# Patient Record
Sex: MALE | Race: WHITE | NOT HISPANIC OR LATINO
[De-identification: names, ages, dates, MRNs, and addresses within clinical notes are randomized per-mention and may not be internally consistent; named-entity substitution may affect disease eponyms.]

---

## 2017-01-04 ENCOUNTER — APPOINTMENT (OUTPATIENT)
Dept: PSYCHIATRY | Facility: CLINIC | Age: 22
End: 2017-01-04

## 2017-01-11 ENCOUNTER — APPOINTMENT (OUTPATIENT)
Dept: PSYCHIATRY | Facility: CLINIC | Age: 22
End: 2017-01-11

## 2017-01-18 ENCOUNTER — APPOINTMENT (OUTPATIENT)
Dept: PSYCHIATRY | Facility: CLINIC | Age: 22
End: 2017-01-18

## 2017-01-25 ENCOUNTER — APPOINTMENT (OUTPATIENT)
Dept: PSYCHIATRY | Facility: CLINIC | Age: 22
End: 2017-01-25

## 2017-01-26 ENCOUNTER — APPOINTMENT (OUTPATIENT)
Dept: PSYCHIATRY | Facility: CLINIC | Age: 22
End: 2017-01-26

## 2017-02-02 ENCOUNTER — APPOINTMENT (OUTPATIENT)
Dept: PSYCHIATRY | Facility: CLINIC | Age: 22
End: 2017-02-02

## 2017-02-08 ENCOUNTER — APPOINTMENT (OUTPATIENT)
Dept: PSYCHIATRY | Facility: CLINIC | Age: 22
End: 2017-02-08

## 2017-02-16 ENCOUNTER — APPOINTMENT (OUTPATIENT)
Dept: PSYCHIATRY | Facility: CLINIC | Age: 22
End: 2017-02-16

## 2017-03-02 ENCOUNTER — APPOINTMENT (OUTPATIENT)
Dept: PSYCHIATRY | Facility: CLINIC | Age: 22
End: 2017-03-02

## 2017-03-09 ENCOUNTER — APPOINTMENT (OUTPATIENT)
Dept: PSYCHIATRY | Facility: CLINIC | Age: 22
End: 2017-03-09

## 2017-03-16 ENCOUNTER — APPOINTMENT (OUTPATIENT)
Dept: PSYCHIATRY | Facility: CLINIC | Age: 22
End: 2017-03-16

## 2017-03-23 ENCOUNTER — APPOINTMENT (OUTPATIENT)
Dept: PSYCHIATRY | Facility: CLINIC | Age: 22
End: 2017-03-23

## 2017-03-30 ENCOUNTER — APPOINTMENT (OUTPATIENT)
Dept: PSYCHIATRY | Facility: CLINIC | Age: 22
End: 2017-03-30

## 2017-04-06 ENCOUNTER — APPOINTMENT (OUTPATIENT)
Dept: PSYCHIATRY | Facility: CLINIC | Age: 22
End: 2017-04-06

## 2017-04-13 ENCOUNTER — APPOINTMENT (OUTPATIENT)
Dept: PSYCHIATRY | Facility: CLINIC | Age: 22
End: 2017-04-13

## 2017-04-20 ENCOUNTER — APPOINTMENT (OUTPATIENT)
Dept: PSYCHIATRY | Facility: CLINIC | Age: 22
End: 2017-04-20

## 2017-04-27 ENCOUNTER — APPOINTMENT (OUTPATIENT)
Dept: PSYCHIATRY | Facility: CLINIC | Age: 22
End: 2017-04-27

## 2017-05-04 ENCOUNTER — APPOINTMENT (OUTPATIENT)
Dept: PSYCHIATRY | Facility: CLINIC | Age: 22
End: 2017-05-04

## 2017-05-11 ENCOUNTER — APPOINTMENT (OUTPATIENT)
Dept: PSYCHIATRY | Facility: CLINIC | Age: 22
End: 2017-05-11

## 2017-05-18 ENCOUNTER — APPOINTMENT (OUTPATIENT)
Dept: PSYCHIATRY | Facility: CLINIC | Age: 22
End: 2017-05-18

## 2017-05-25 ENCOUNTER — APPOINTMENT (OUTPATIENT)
Dept: PSYCHIATRY | Facility: CLINIC | Age: 22
End: 2017-05-25

## 2017-06-01 ENCOUNTER — APPOINTMENT (OUTPATIENT)
Dept: PSYCHIATRY | Facility: CLINIC | Age: 22
End: 2017-06-01

## 2017-06-08 ENCOUNTER — APPOINTMENT (OUTPATIENT)
Dept: PSYCHIATRY | Facility: CLINIC | Age: 22
End: 2017-06-08

## 2017-06-15 ENCOUNTER — APPOINTMENT (OUTPATIENT)
Dept: PSYCHIATRY | Facility: CLINIC | Age: 22
End: 2017-06-15

## 2017-06-22 ENCOUNTER — APPOINTMENT (OUTPATIENT)
Dept: PSYCHIATRY | Facility: CLINIC | Age: 22
End: 2017-06-22

## 2017-06-29 ENCOUNTER — APPOINTMENT (OUTPATIENT)
Dept: PSYCHIATRY | Facility: CLINIC | Age: 22
End: 2017-06-29

## 2017-07-06 ENCOUNTER — APPOINTMENT (OUTPATIENT)
Dept: PSYCHIATRY | Facility: CLINIC | Age: 22
End: 2017-07-06

## 2017-07-13 ENCOUNTER — APPOINTMENT (OUTPATIENT)
Dept: PSYCHIATRY | Facility: CLINIC | Age: 22
End: 2017-07-13

## 2017-07-24 ENCOUNTER — APPOINTMENT (OUTPATIENT)
Dept: PSYCHIATRY | Facility: CLINIC | Age: 22
End: 2017-07-24

## 2017-07-28 ENCOUNTER — APPOINTMENT (OUTPATIENT)
Dept: PSYCHIATRY | Facility: CLINIC | Age: 22
End: 2017-07-28
Payer: COMMERCIAL

## 2017-07-28 PROCEDURE — 90834 PSYTX W PT 45 MINUTES: CPT

## 2017-07-31 ENCOUNTER — APPOINTMENT (OUTPATIENT)
Dept: PSYCHIATRY | Facility: CLINIC | Age: 22
End: 2017-07-31
Payer: COMMERCIAL

## 2017-07-31 PROCEDURE — 99214 OFFICE O/P EST MOD 30 MIN: CPT

## 2017-08-02 ENCOUNTER — APPOINTMENT (OUTPATIENT)
Dept: PSYCHIATRY | Facility: CLINIC | Age: 22
End: 2017-08-02
Payer: COMMERCIAL

## 2017-08-02 PROCEDURE — 90834 PSYTX W PT 45 MINUTES: CPT

## 2017-08-18 ENCOUNTER — APPOINTMENT (OUTPATIENT)
Dept: PSYCHIATRY | Facility: CLINIC | Age: 22
End: 2017-08-18
Payer: COMMERCIAL

## 2017-08-18 PROCEDURE — 90834 PSYTX W PT 45 MINUTES: CPT

## 2017-09-13 ENCOUNTER — APPOINTMENT (OUTPATIENT)
Dept: PSYCHIATRY | Facility: CLINIC | Age: 22
End: 2017-09-13
Payer: COMMERCIAL

## 2017-09-13 PROCEDURE — 90834 PSYTX W PT 45 MINUTES: CPT

## 2017-09-20 ENCOUNTER — APPOINTMENT (OUTPATIENT)
Dept: PSYCHIATRY | Facility: CLINIC | Age: 22
End: 2017-09-20

## 2017-09-27 ENCOUNTER — APPOINTMENT (OUTPATIENT)
Dept: PSYCHIATRY | Facility: CLINIC | Age: 22
End: 2017-09-27

## 2017-09-29 ENCOUNTER — APPOINTMENT (OUTPATIENT)
Dept: PSYCHIATRY | Facility: CLINIC | Age: 22
End: 2017-09-29
Payer: COMMERCIAL

## 2017-09-29 PROCEDURE — 99214 OFFICE O/P EST MOD 30 MIN: CPT

## 2017-10-04 ENCOUNTER — APPOINTMENT (OUTPATIENT)
Dept: PSYCHIATRY | Facility: CLINIC | Age: 22
End: 2017-10-04

## 2017-10-18 ENCOUNTER — APPOINTMENT (OUTPATIENT)
Dept: PSYCHIATRY | Facility: CLINIC | Age: 22
End: 2017-10-18
Payer: COMMERCIAL

## 2017-10-18 PROCEDURE — 90834 PSYTX W PT 45 MINUTES: CPT

## 2017-10-20 ENCOUNTER — APPOINTMENT (OUTPATIENT)
Dept: PSYCHIATRY | Facility: CLINIC | Age: 22
End: 2017-10-20
Payer: COMMERCIAL

## 2017-10-20 PROCEDURE — 99214 OFFICE O/P EST MOD 30 MIN: CPT

## 2017-10-20 RX ORDER — DICLOFENAC SODIUM 75 MG/1
75 TABLET, DELAYED RELEASE ORAL
Qty: 60 | Refills: 0 | Status: DISCONTINUED | COMMUNITY
Start: 2017-05-31

## 2017-11-01 ENCOUNTER — APPOINTMENT (OUTPATIENT)
Dept: PSYCHIATRY | Facility: CLINIC | Age: 22
End: 2017-11-01
Payer: COMMERCIAL

## 2017-11-01 PROCEDURE — 90834 PSYTX W PT 45 MINUTES: CPT

## 2017-11-09 ENCOUNTER — APPOINTMENT (OUTPATIENT)
Dept: PSYCHIATRY | Facility: CLINIC | Age: 22
End: 2017-11-09
Payer: COMMERCIAL

## 2017-11-09 PROCEDURE — 90837 PSYTX W PT 60 MINUTES: CPT

## 2017-11-15 ENCOUNTER — APPOINTMENT (OUTPATIENT)
Dept: PSYCHIATRY | Facility: CLINIC | Age: 22
End: 2017-11-15
Payer: COMMERCIAL

## 2017-11-15 PROCEDURE — 90837 PSYTX W PT 60 MINUTES: CPT

## 2017-11-21 ENCOUNTER — APPOINTMENT (OUTPATIENT)
Dept: PSYCHIATRY | Facility: CLINIC | Age: 22
End: 2017-11-21
Payer: COMMERCIAL

## 2017-11-21 PROCEDURE — 90837 PSYTX W PT 60 MINUTES: CPT

## 2017-11-27 ENCOUNTER — APPOINTMENT (OUTPATIENT)
Dept: PSYCHIATRY | Facility: CLINIC | Age: 22
End: 2017-11-27

## 2017-12-01 ENCOUNTER — APPOINTMENT (OUTPATIENT)
Dept: PSYCHIATRY | Facility: CLINIC | Age: 22
End: 2017-12-01
Payer: COMMERCIAL

## 2017-12-01 PROCEDURE — 90837 PSYTX W PT 60 MINUTES: CPT

## 2017-12-11 ENCOUNTER — APPOINTMENT (OUTPATIENT)
Dept: PSYCHIATRY | Facility: CLINIC | Age: 22
End: 2017-12-11
Payer: COMMERCIAL

## 2017-12-11 PROCEDURE — 90837 PSYTX W PT 60 MINUTES: CPT

## 2017-12-18 ENCOUNTER — APPOINTMENT (OUTPATIENT)
Dept: PSYCHIATRY | Facility: CLINIC | Age: 22
End: 2017-12-18
Payer: COMMERCIAL

## 2017-12-18 PROCEDURE — 90837 PSYTX W PT 60 MINUTES: CPT

## 2017-12-18 PROCEDURE — 99214 OFFICE O/P EST MOD 30 MIN: CPT

## 2017-12-18 RX ORDER — ERYTHROMYCIN AND BENZOYL PEROXIDE 3 %-5 %
5-3 KIT TOPICAL
Qty: 47 | Refills: 0 | Status: DISCONTINUED | COMMUNITY
Start: 2017-12-14

## 2017-12-18 RX ORDER — DOXYCYCLINE HYCLATE 100 MG/1
100 TABLET ORAL
Qty: 30 | Refills: 0 | Status: DISCONTINUED | COMMUNITY
Start: 2017-12-14

## 2018-01-03 ENCOUNTER — APPOINTMENT (OUTPATIENT)
Dept: PSYCHIATRY | Facility: CLINIC | Age: 23
End: 2018-01-03
Payer: COMMERCIAL

## 2018-01-03 PROCEDURE — 90837 PSYTX W PT 60 MINUTES: CPT

## 2018-01-08 ENCOUNTER — APPOINTMENT (OUTPATIENT)
Dept: PSYCHIATRY | Facility: CLINIC | Age: 23
End: 2018-01-08
Payer: COMMERCIAL

## 2018-01-08 PROCEDURE — 90837 PSYTX W PT 60 MINUTES: CPT

## 2018-01-16 ENCOUNTER — APPOINTMENT (OUTPATIENT)
Dept: PSYCHIATRY | Facility: CLINIC | Age: 23
End: 2018-01-16

## 2018-01-22 ENCOUNTER — APPOINTMENT (OUTPATIENT)
Dept: PSYCHIATRY | Facility: CLINIC | Age: 23
End: 2018-01-22
Payer: COMMERCIAL

## 2018-01-22 PROCEDURE — 90837 PSYTX W PT 60 MINUTES: CPT

## 2018-01-23 ENCOUNTER — APPOINTMENT (OUTPATIENT)
Dept: PSYCHIATRY | Facility: CLINIC | Age: 23
End: 2018-01-23
Payer: COMMERCIAL

## 2018-01-23 PROCEDURE — 90837 PSYTX W PT 60 MINUTES: CPT

## 2018-01-29 ENCOUNTER — APPOINTMENT (OUTPATIENT)
Dept: PSYCHIATRY | Facility: CLINIC | Age: 23
End: 2018-01-29
Payer: COMMERCIAL

## 2018-01-29 ENCOUNTER — APPOINTMENT (OUTPATIENT)
Dept: PSYCHIATRY | Facility: CLINIC | Age: 23
End: 2018-01-29

## 2018-01-29 PROCEDURE — 90853 GROUP PSYCHOTHERAPY: CPT

## 2018-02-05 ENCOUNTER — APPOINTMENT (OUTPATIENT)
Dept: PSYCHIATRY | Facility: CLINIC | Age: 23
End: 2018-02-05
Payer: COMMERCIAL

## 2018-02-05 PROCEDURE — 90853 GROUP PSYCHOTHERAPY: CPT

## 2018-02-12 ENCOUNTER — APPOINTMENT (OUTPATIENT)
Dept: PSYCHIATRY | Facility: CLINIC | Age: 23
End: 2018-02-12
Payer: COMMERCIAL

## 2018-02-12 PROCEDURE — 90853 GROUP PSYCHOTHERAPY: CPT

## 2018-02-26 ENCOUNTER — APPOINTMENT (OUTPATIENT)
Dept: PSYCHIATRY | Facility: CLINIC | Age: 23
End: 2018-02-26
Payer: COMMERCIAL

## 2018-02-26 PROCEDURE — 90853 GROUP PSYCHOTHERAPY: CPT

## 2018-03-05 ENCOUNTER — APPOINTMENT (OUTPATIENT)
Dept: PSYCHIATRY | Facility: CLINIC | Age: 23
End: 2018-03-05
Payer: COMMERCIAL

## 2018-03-05 ENCOUNTER — APPOINTMENT (OUTPATIENT)
Dept: PSYCHIATRY | Facility: CLINIC | Age: 23
End: 2018-03-05

## 2018-03-05 PROCEDURE — 90853 GROUP PSYCHOTHERAPY: CPT

## 2018-03-12 ENCOUNTER — APPOINTMENT (OUTPATIENT)
Dept: PSYCHIATRY | Facility: CLINIC | Age: 23
End: 2018-03-12

## 2018-03-19 ENCOUNTER — APPOINTMENT (OUTPATIENT)
Dept: PSYCHIATRY | Facility: CLINIC | Age: 23
End: 2018-03-19
Payer: COMMERCIAL

## 2018-03-19 PROCEDURE — 90853 GROUP PSYCHOTHERAPY: CPT

## 2018-03-20 ENCOUNTER — APPOINTMENT (OUTPATIENT)
Dept: PSYCHIATRY | Facility: CLINIC | Age: 23
End: 2018-03-20
Payer: COMMERCIAL

## 2018-03-20 PROCEDURE — 99214 OFFICE O/P EST MOD 30 MIN: CPT

## 2018-03-21 ENCOUNTER — APPOINTMENT (OUTPATIENT)
Dept: PSYCHIATRY | Facility: CLINIC | Age: 23
End: 2018-03-21

## 2018-03-23 ENCOUNTER — APPOINTMENT (OUTPATIENT)
Dept: PSYCHIATRY | Facility: CLINIC | Age: 23
End: 2018-03-23
Payer: COMMERCIAL

## 2018-03-23 PROCEDURE — 90837 PSYTX W PT 60 MINUTES: CPT

## 2018-03-26 ENCOUNTER — APPOINTMENT (OUTPATIENT)
Dept: PSYCHIATRY | Facility: CLINIC | Age: 23
End: 2018-03-26
Payer: COMMERCIAL

## 2018-03-26 PROCEDURE — 90853 GROUP PSYCHOTHERAPY: CPT

## 2018-03-28 ENCOUNTER — APPOINTMENT (OUTPATIENT)
Dept: PSYCHIATRY | Facility: CLINIC | Age: 23
End: 2018-03-28

## 2018-04-02 ENCOUNTER — APPOINTMENT (OUTPATIENT)
Dept: PSYCHIATRY | Facility: CLINIC | Age: 23
End: 2018-04-02
Payer: COMMERCIAL

## 2018-04-02 PROCEDURE — 90853 GROUP PSYCHOTHERAPY: CPT

## 2018-04-09 ENCOUNTER — APPOINTMENT (OUTPATIENT)
Dept: PSYCHIATRY | Facility: CLINIC | Age: 23
End: 2018-04-09
Payer: COMMERCIAL

## 2018-04-09 PROCEDURE — 90853 GROUP PSYCHOTHERAPY: CPT

## 2018-04-16 ENCOUNTER — APPOINTMENT (OUTPATIENT)
Dept: PSYCHIATRY | Facility: CLINIC | Age: 23
End: 2018-04-16
Payer: COMMERCIAL

## 2018-04-16 PROCEDURE — 90853 GROUP PSYCHOTHERAPY: CPT

## 2018-04-23 ENCOUNTER — APPOINTMENT (OUTPATIENT)
Dept: PSYCHIATRY | Facility: CLINIC | Age: 23
End: 2018-04-23
Payer: COMMERCIAL

## 2018-04-23 PROCEDURE — 90853 GROUP PSYCHOTHERAPY: CPT

## 2018-04-24 ENCOUNTER — APPOINTMENT (OUTPATIENT)
Dept: MRI IMAGING | Facility: CLINIC | Age: 23
End: 2018-04-24

## 2018-04-24 ENCOUNTER — OUTPATIENT (OUTPATIENT)
Dept: OUTPATIENT SERVICES | Facility: HOSPITAL | Age: 23
LOS: 1 days | End: 2018-04-24
Payer: COMMERCIAL

## 2018-04-24 DIAGNOSIS — Z00.8 ENCOUNTER FOR OTHER GENERAL EXAMINATION: ICD-10-CM

## 2018-04-24 PROCEDURE — 73721 MRI JNT OF LWR EXTRE W/O DYE: CPT | Mod: 26,LT

## 2018-04-24 PROCEDURE — 73721 MRI JNT OF LWR EXTRE W/O DYE: CPT

## 2018-04-30 ENCOUNTER — APPOINTMENT (OUTPATIENT)
Dept: PSYCHIATRY | Facility: CLINIC | Age: 23
End: 2018-04-30
Payer: COMMERCIAL

## 2018-04-30 PROCEDURE — 90837 PSYTX W PT 60 MINUTES: CPT

## 2018-05-02 ENCOUNTER — APPOINTMENT (OUTPATIENT)
Dept: PSYCHIATRY | Facility: CLINIC | Age: 23
End: 2018-05-02
Payer: COMMERCIAL

## 2018-05-02 PROCEDURE — 99214 OFFICE O/P EST MOD 30 MIN: CPT

## 2018-05-07 ENCOUNTER — APPOINTMENT (OUTPATIENT)
Dept: PSYCHIATRY | Facility: CLINIC | Age: 23
End: 2018-05-07

## 2018-05-07 ENCOUNTER — APPOINTMENT (OUTPATIENT)
Dept: PSYCHIATRY | Facility: CLINIC | Age: 23
End: 2018-05-07
Payer: COMMERCIAL

## 2018-05-07 PROCEDURE — 90837 PSYTX W PT 60 MINUTES: CPT

## 2018-05-14 ENCOUNTER — APPOINTMENT (OUTPATIENT)
Dept: PSYCHIATRY | Facility: CLINIC | Age: 23
End: 2018-05-14
Payer: COMMERCIAL

## 2018-05-14 PROCEDURE — 90837 PSYTX W PT 60 MINUTES: CPT

## 2018-05-21 ENCOUNTER — APPOINTMENT (OUTPATIENT)
Dept: PSYCHIATRY | Facility: CLINIC | Age: 23
End: 2018-05-21
Payer: COMMERCIAL

## 2018-05-21 PROCEDURE — 90832 PSYTX W PT 30 MINUTES: CPT

## 2018-06-04 ENCOUNTER — APPOINTMENT (OUTPATIENT)
Dept: PSYCHIATRY | Facility: CLINIC | Age: 23
End: 2018-06-04
Payer: COMMERCIAL

## 2018-06-04 PROCEDURE — 99214 OFFICE O/P EST MOD 30 MIN: CPT

## 2018-06-04 PROCEDURE — 90832 PSYTX W PT 30 MINUTES: CPT

## 2018-06-11 ENCOUNTER — APPOINTMENT (OUTPATIENT)
Dept: PSYCHIATRY | Facility: CLINIC | Age: 23
End: 2018-06-11
Payer: COMMERCIAL

## 2018-06-11 PROCEDURE — 90837 PSYTX W PT 60 MINUTES: CPT

## 2018-06-25 ENCOUNTER — APPOINTMENT (OUTPATIENT)
Dept: PSYCHIATRY | Facility: CLINIC | Age: 23
End: 2018-06-25
Payer: COMMERCIAL

## 2018-06-25 PROCEDURE — 90837 PSYTX W PT 60 MINUTES: CPT

## 2018-07-16 ENCOUNTER — APPOINTMENT (OUTPATIENT)
Dept: PSYCHIATRY | Facility: CLINIC | Age: 23
End: 2018-07-16
Payer: COMMERCIAL

## 2018-07-16 PROCEDURE — 90837 PSYTX W PT 60 MINUTES: CPT

## 2018-07-30 ENCOUNTER — APPOINTMENT (OUTPATIENT)
Dept: PSYCHIATRY | Facility: CLINIC | Age: 23
End: 2018-07-30
Payer: COMMERCIAL

## 2018-07-30 PROCEDURE — 90837 PSYTX W PT 60 MINUTES: CPT

## 2018-08-06 ENCOUNTER — APPOINTMENT (OUTPATIENT)
Dept: PSYCHIATRY | Facility: CLINIC | Age: 23
End: 2018-08-06
Payer: COMMERCIAL

## 2018-08-06 PROCEDURE — 90834 PSYTX W PT 45 MINUTES: CPT

## 2018-08-27 ENCOUNTER — APPOINTMENT (OUTPATIENT)
Dept: PSYCHIATRY | Facility: CLINIC | Age: 23
End: 2018-08-27
Payer: COMMERCIAL

## 2018-08-27 PROCEDURE — 90837 PSYTX W PT 60 MINUTES: CPT

## 2018-09-17 ENCOUNTER — APPOINTMENT (OUTPATIENT)
Dept: PSYCHIATRY | Facility: CLINIC | Age: 23
End: 2018-09-17
Payer: COMMERCIAL

## 2018-09-17 PROCEDURE — 90837 PSYTX W PT 60 MINUTES: CPT

## 2018-09-24 ENCOUNTER — APPOINTMENT (OUTPATIENT)
Dept: PSYCHIATRY | Facility: CLINIC | Age: 23
End: 2018-09-24
Payer: COMMERCIAL

## 2018-09-24 PROCEDURE — 99214 OFFICE O/P EST MOD 30 MIN: CPT

## 2018-10-01 ENCOUNTER — APPOINTMENT (OUTPATIENT)
Dept: PSYCHIATRY | Facility: CLINIC | Age: 23
End: 2018-10-01
Payer: COMMERCIAL

## 2018-10-01 PROCEDURE — 90837 PSYTX W PT 60 MINUTES: CPT

## 2018-10-15 ENCOUNTER — APPOINTMENT (OUTPATIENT)
Dept: PSYCHIATRY | Facility: CLINIC | Age: 23
End: 2018-10-15
Payer: COMMERCIAL

## 2018-10-15 PROCEDURE — 90837 PSYTX W PT 60 MINUTES: CPT

## 2018-11-12 ENCOUNTER — APPOINTMENT (OUTPATIENT)
Dept: PSYCHIATRY | Facility: CLINIC | Age: 23
End: 2018-11-12
Payer: COMMERCIAL

## 2018-11-12 PROCEDURE — 90837 PSYTX W PT 60 MINUTES: CPT

## 2018-12-10 ENCOUNTER — APPOINTMENT (OUTPATIENT)
Dept: PSYCHIATRY | Facility: CLINIC | Age: 23
End: 2018-12-10
Payer: COMMERCIAL

## 2018-12-10 PROCEDURE — 90837 PSYTX W PT 60 MINUTES: CPT

## 2018-12-10 PROCEDURE — 99214 OFFICE O/P EST MOD 30 MIN: CPT

## 2019-01-14 ENCOUNTER — APPOINTMENT (OUTPATIENT)
Dept: PSYCHIATRY | Facility: CLINIC | Age: 24
End: 2019-01-14
Payer: COMMERCIAL

## 2019-01-14 PROCEDURE — 90837 PSYTX W PT 60 MINUTES: CPT

## 2019-02-11 ENCOUNTER — APPOINTMENT (OUTPATIENT)
Dept: PSYCHIATRY | Facility: CLINIC | Age: 24
End: 2019-02-11
Payer: COMMERCIAL

## 2019-02-11 PROCEDURE — 90837 PSYTX W PT 60 MINUTES: CPT

## 2019-03-13 ENCOUNTER — APPOINTMENT (OUTPATIENT)
Dept: PSYCHIATRY | Facility: CLINIC | Age: 24
End: 2019-03-13
Payer: COMMERCIAL

## 2019-03-13 PROCEDURE — 90837 PSYTX W PT 60 MINUTES: CPT

## 2019-03-29 ENCOUNTER — APPOINTMENT (OUTPATIENT)
Dept: PSYCHIATRY | Facility: CLINIC | Age: 24
End: 2019-03-29
Payer: COMMERCIAL

## 2019-03-29 PROCEDURE — 90834 PSYTX W PT 45 MINUTES: CPT

## 2019-04-10 ENCOUNTER — APPOINTMENT (OUTPATIENT)
Dept: PSYCHIATRY | Facility: CLINIC | Age: 24
End: 2019-04-10
Payer: COMMERCIAL

## 2019-04-10 PROCEDURE — 90837 PSYTX W PT 60 MINUTES: CPT

## 2019-05-01 ENCOUNTER — APPOINTMENT (OUTPATIENT)
Dept: PSYCHIATRY | Facility: CLINIC | Age: 24
End: 2019-05-01
Payer: COMMERCIAL

## 2019-05-01 PROCEDURE — 90837 PSYTX W PT 60 MINUTES: CPT

## 2019-05-01 PROCEDURE — 99214 OFFICE O/P EST MOD 30 MIN: CPT

## 2019-05-01 RX ORDER — BUPROPION HYDROCHLORIDE 300 MG/1
300 TABLET, EXTENDED RELEASE ORAL
Qty: 90 | Refills: 0 | Status: DISCONTINUED | COMMUNITY
Start: 2017-09-29 | End: 2018-12-10

## 2019-05-15 ENCOUNTER — APPOINTMENT (OUTPATIENT)
Dept: PSYCHIATRY | Facility: CLINIC | Age: 24
End: 2019-05-15
Payer: COMMERCIAL

## 2019-05-15 PROCEDURE — 90837 PSYTX W PT 60 MINUTES: CPT

## 2019-05-15 PROCEDURE — 99214 OFFICE O/P EST MOD 30 MIN: CPT

## 2019-05-22 ENCOUNTER — APPOINTMENT (OUTPATIENT)
Dept: PSYCHIATRY | Facility: CLINIC | Age: 24
End: 2019-05-22
Payer: COMMERCIAL

## 2019-05-22 PROCEDURE — 90837 PSYTX W PT 60 MINUTES: CPT

## 2019-05-29 ENCOUNTER — APPOINTMENT (OUTPATIENT)
Dept: PSYCHIATRY | Facility: CLINIC | Age: 24
End: 2019-05-29
Payer: COMMERCIAL

## 2019-05-29 PROCEDURE — 90837 PSYTX W PT 60 MINUTES: CPT

## 2019-06-05 ENCOUNTER — APPOINTMENT (OUTPATIENT)
Dept: PSYCHIATRY | Facility: CLINIC | Age: 24
End: 2019-06-05
Payer: COMMERCIAL

## 2019-06-05 PROCEDURE — 90837 PSYTX W PT 60 MINUTES: CPT

## 2019-06-05 PROCEDURE — 99214 OFFICE O/P EST MOD 30 MIN: CPT

## 2019-06-13 ENCOUNTER — APPOINTMENT (OUTPATIENT)
Dept: PSYCHIATRY | Facility: CLINIC | Age: 24
End: 2019-06-13

## 2019-07-10 ENCOUNTER — APPOINTMENT (OUTPATIENT)
Dept: PSYCHIATRY | Facility: CLINIC | Age: 24
End: 2019-07-10
Payer: COMMERCIAL

## 2019-07-10 PROCEDURE — 90837 PSYTX W PT 60 MINUTES: CPT

## 2019-07-24 ENCOUNTER — APPOINTMENT (OUTPATIENT)
Dept: PSYCHIATRY | Facility: CLINIC | Age: 24
End: 2019-07-24
Payer: COMMERCIAL

## 2019-07-24 PROCEDURE — 90837 PSYTX W PT 60 MINUTES: CPT

## 2019-08-07 ENCOUNTER — APPOINTMENT (OUTPATIENT)
Dept: PSYCHIATRY | Facility: CLINIC | Age: 24
End: 2019-08-07
Payer: COMMERCIAL

## 2019-08-07 PROCEDURE — 90837 PSYTX W PT 60 MINUTES: CPT

## 2019-08-20 ENCOUNTER — APPOINTMENT (OUTPATIENT)
Dept: PSYCHIATRY | Facility: CLINIC | Age: 24
End: 2019-08-20
Payer: COMMERCIAL

## 2019-08-20 PROCEDURE — 90837 PSYTX W PT 60 MINUTES: CPT

## 2019-09-04 ENCOUNTER — APPOINTMENT (OUTPATIENT)
Dept: PSYCHIATRY | Facility: CLINIC | Age: 24
End: 2019-09-04
Payer: COMMERCIAL

## 2019-09-04 PROCEDURE — 90837 PSYTX W PT 60 MINUTES: CPT

## 2019-09-04 PROCEDURE — 99214 OFFICE O/P EST MOD 30 MIN: CPT

## 2019-09-17 ENCOUNTER — APPOINTMENT (OUTPATIENT)
Dept: PSYCHIATRY | Facility: CLINIC | Age: 24
End: 2019-09-17
Payer: COMMERCIAL

## 2019-09-17 PROCEDURE — 90837 PSYTX W PT 60 MINUTES: CPT

## 2019-10-03 ENCOUNTER — APPOINTMENT (OUTPATIENT)
Dept: PSYCHIATRY | Facility: CLINIC | Age: 24
End: 2019-10-03
Payer: COMMERCIAL

## 2019-10-03 PROCEDURE — 90834 PSYTX W PT 45 MINUTES: CPT

## 2019-10-15 ENCOUNTER — APPOINTMENT (OUTPATIENT)
Dept: PSYCHIATRY | Facility: CLINIC | Age: 24
End: 2019-10-15
Payer: COMMERCIAL

## 2019-10-15 PROCEDURE — 90837 PSYTX W PT 60 MINUTES: CPT

## 2019-10-29 ENCOUNTER — APPOINTMENT (OUTPATIENT)
Dept: PSYCHIATRY | Facility: CLINIC | Age: 24
End: 2019-10-29

## 2019-11-12 ENCOUNTER — APPOINTMENT (OUTPATIENT)
Dept: PSYCHIATRY | Facility: CLINIC | Age: 24
End: 2019-11-12
Payer: COMMERCIAL

## 2019-11-12 PROCEDURE — 90837 PSYTX W PT 60 MINUTES: CPT

## 2019-12-03 ENCOUNTER — APPOINTMENT (OUTPATIENT)
Dept: PSYCHIATRY | Facility: CLINIC | Age: 24
End: 2019-12-03
Payer: COMMERCIAL

## 2019-12-03 PROCEDURE — 90834 PSYTX W PT 45 MINUTES: CPT

## 2019-12-03 PROCEDURE — 99214 OFFICE O/P EST MOD 30 MIN: CPT

## 2019-12-16 ENCOUNTER — APPOINTMENT (OUTPATIENT)
Dept: PSYCHIATRY | Facility: CLINIC | Age: 24
End: 2019-12-16
Payer: COMMERCIAL

## 2019-12-16 PROCEDURE — 90837 PSYTX W PT 60 MINUTES: CPT

## 2019-12-30 ENCOUNTER — APPOINTMENT (OUTPATIENT)
Dept: PSYCHIATRY | Facility: CLINIC | Age: 24
End: 2019-12-30
Payer: COMMERCIAL

## 2019-12-30 ENCOUNTER — APPOINTMENT (OUTPATIENT)
Dept: PSYCHIATRY | Facility: CLINIC | Age: 24
End: 2019-12-30

## 2019-12-30 PROCEDURE — 90837 PSYTX W PT 60 MINUTES: CPT

## 2020-01-14 ENCOUNTER — APPOINTMENT (OUTPATIENT)
Dept: PSYCHIATRY | Facility: CLINIC | Age: 25
End: 2020-01-14
Payer: COMMERCIAL

## 2020-01-14 PROCEDURE — 90837 PSYTX W PT 60 MINUTES: CPT

## 2020-01-28 ENCOUNTER — APPOINTMENT (OUTPATIENT)
Dept: PSYCHIATRY | Facility: CLINIC | Age: 25
End: 2020-01-28

## 2020-02-03 ENCOUNTER — APPOINTMENT (OUTPATIENT)
Dept: PSYCHIATRY | Facility: CLINIC | Age: 25
End: 2020-02-03
Payer: COMMERCIAL

## 2020-02-03 PROCEDURE — 90834 PSYTX W PT 45 MINUTES: CPT

## 2020-02-11 ENCOUNTER — APPOINTMENT (OUTPATIENT)
Dept: PSYCHIATRY | Facility: CLINIC | Age: 25
End: 2020-02-11

## 2020-02-18 ENCOUNTER — APPOINTMENT (OUTPATIENT)
Dept: PSYCHIATRY | Facility: CLINIC | Age: 25
End: 2020-02-18
Payer: COMMERCIAL

## 2020-02-18 PROCEDURE — 90834 PSYTX W PT 45 MINUTES: CPT

## 2020-03-03 ENCOUNTER — APPOINTMENT (OUTPATIENT)
Dept: PSYCHIATRY | Facility: CLINIC | Age: 25
End: 2020-03-03
Payer: COMMERCIAL

## 2020-03-03 PROCEDURE — 90834 PSYTX W PT 45 MINUTES: CPT

## 2020-03-17 ENCOUNTER — APPOINTMENT (OUTPATIENT)
Dept: PSYCHIATRY | Facility: CLINIC | Age: 25
End: 2020-03-17
Payer: COMMERCIAL

## 2020-03-17 PROCEDURE — 99443: CPT

## 2020-03-31 ENCOUNTER — APPOINTMENT (OUTPATIENT)
Dept: PSYCHIATRY | Facility: CLINIC | Age: 25
End: 2020-03-31
Payer: COMMERCIAL

## 2020-03-31 PROCEDURE — 98968 PH1 ASSMT&MGMT NQHP 21-30: CPT

## 2020-04-21 ENCOUNTER — APPOINTMENT (OUTPATIENT)
Dept: PSYCHIATRY | Facility: CLINIC | Age: 25
End: 2020-04-21
Payer: COMMERCIAL

## 2020-04-21 PROCEDURE — 90834 PSYTX W PT 45 MINUTES: CPT | Mod: 95

## 2020-05-05 ENCOUNTER — APPOINTMENT (OUTPATIENT)
Dept: PSYCHIATRY | Facility: CLINIC | Age: 25
End: 2020-05-05
Payer: COMMERCIAL

## 2020-05-05 PROCEDURE — 90834 PSYTX W PT 45 MINUTES: CPT | Mod: 95

## 2020-05-20 ENCOUNTER — APPOINTMENT (OUTPATIENT)
Dept: PSYCHIATRY | Facility: CLINIC | Age: 25
End: 2020-05-20
Payer: COMMERCIAL

## 2020-05-20 PROCEDURE — 90834 PSYTX W PT 45 MINUTES: CPT | Mod: 95

## 2020-05-27 ENCOUNTER — APPOINTMENT (OUTPATIENT)
Dept: PSYCHIATRY | Facility: CLINIC | Age: 25
End: 2020-05-27
Payer: COMMERCIAL

## 2020-05-27 PROCEDURE — 99214 OFFICE O/P EST MOD 30 MIN: CPT

## 2020-06-03 ENCOUNTER — APPOINTMENT (OUTPATIENT)
Dept: PSYCHIATRY | Facility: CLINIC | Age: 25
End: 2020-06-03
Payer: COMMERCIAL

## 2020-06-03 PROCEDURE — 90834 PSYTX W PT 45 MINUTES: CPT | Mod: 95

## 2020-06-17 ENCOUNTER — APPOINTMENT (OUTPATIENT)
Dept: PSYCHIATRY | Facility: CLINIC | Age: 25
End: 2020-06-17
Payer: COMMERCIAL

## 2020-06-17 PROCEDURE — 90834 PSYTX W PT 45 MINUTES: CPT

## 2020-07-01 ENCOUNTER — APPOINTMENT (OUTPATIENT)
Dept: PSYCHIATRY | Facility: CLINIC | Age: 25
End: 2020-07-01
Payer: COMMERCIAL

## 2020-07-01 PROCEDURE — 90834 PSYTX W PT 45 MINUTES: CPT

## 2020-07-15 ENCOUNTER — APPOINTMENT (OUTPATIENT)
Dept: PSYCHIATRY | Facility: CLINIC | Age: 25
End: 2020-07-15
Payer: COMMERCIAL

## 2020-07-15 PROCEDURE — 90834 PSYTX W PT 45 MINUTES: CPT

## 2020-07-29 ENCOUNTER — APPOINTMENT (OUTPATIENT)
Dept: PSYCHIATRY | Facility: CLINIC | Age: 25
End: 2020-07-29
Payer: COMMERCIAL

## 2020-07-29 PROCEDURE — 90834 PSYTX W PT 45 MINUTES: CPT

## 2020-08-12 ENCOUNTER — APPOINTMENT (OUTPATIENT)
Dept: PSYCHIATRY | Facility: CLINIC | Age: 25
End: 2020-08-12
Payer: COMMERCIAL

## 2020-08-12 PROCEDURE — 90834 PSYTX W PT 45 MINUTES: CPT

## 2020-08-26 ENCOUNTER — APPOINTMENT (OUTPATIENT)
Dept: PSYCHIATRY | Facility: CLINIC | Age: 25
End: 2020-08-26
Payer: COMMERCIAL

## 2020-08-26 PROCEDURE — 90834 PSYTX W PT 45 MINUTES: CPT

## 2020-09-09 ENCOUNTER — APPOINTMENT (OUTPATIENT)
Dept: PSYCHIATRY | Facility: CLINIC | Age: 25
End: 2020-09-09
Payer: COMMERCIAL

## 2020-09-09 PROCEDURE — 90834 PSYTX W PT 45 MINUTES: CPT

## 2020-09-25 ENCOUNTER — APPOINTMENT (OUTPATIENT)
Dept: PSYCHIATRY | Facility: CLINIC | Age: 25
End: 2020-09-25

## 2020-10-21 ENCOUNTER — APPOINTMENT (OUTPATIENT)
Dept: PSYCHIATRY | Facility: CLINIC | Age: 25
End: 2020-10-21

## 2020-10-26 ENCOUNTER — APPOINTMENT (OUTPATIENT)
Dept: PSYCHIATRY | Facility: CLINIC | Age: 25
End: 2020-10-26
Payer: COMMERCIAL

## 2020-10-26 PROCEDURE — 90837 PSYTX W PT 60 MINUTES: CPT

## 2020-10-26 PROCEDURE — 99072 ADDL SUPL MATRL&STAF TM PHE: CPT

## 2020-11-06 ENCOUNTER — APPOINTMENT (OUTPATIENT)
Dept: PSYCHIATRY | Facility: CLINIC | Age: 25
End: 2020-11-06
Payer: COMMERCIAL

## 2020-11-06 PROCEDURE — 99072 ADDL SUPL MATRL&STAF TM PHE: CPT

## 2020-11-06 PROCEDURE — 99214 OFFICE O/P EST MOD 30 MIN: CPT

## 2020-11-12 ENCOUNTER — APPOINTMENT (OUTPATIENT)
Dept: PSYCHIATRY | Facility: CLINIC | Age: 25
End: 2020-11-12
Payer: COMMERCIAL

## 2020-11-12 PROCEDURE — 90837 PSYTX W PT 60 MINUTES: CPT

## 2020-11-12 PROCEDURE — 99072 ADDL SUPL MATRL&STAF TM PHE: CPT

## 2020-12-02 ENCOUNTER — APPOINTMENT (OUTPATIENT)
Dept: PSYCHIATRY | Facility: CLINIC | Age: 25
End: 2020-12-02
Payer: COMMERCIAL

## 2020-12-02 PROCEDURE — 99072 ADDL SUPL MATRL&STAF TM PHE: CPT

## 2020-12-02 PROCEDURE — 90837 PSYTX W PT 60 MINUTES: CPT

## 2020-12-30 ENCOUNTER — APPOINTMENT (OUTPATIENT)
Dept: PSYCHIATRY | Facility: CLINIC | Age: 25
End: 2020-12-30
Payer: COMMERCIAL

## 2020-12-30 PROCEDURE — 90837 PSYTX W PT 60 MINUTES: CPT

## 2020-12-30 PROCEDURE — 99072 ADDL SUPL MATRL&STAF TM PHE: CPT

## 2021-01-13 ENCOUNTER — APPOINTMENT (OUTPATIENT)
Dept: PSYCHIATRY | Facility: CLINIC | Age: 26
End: 2021-01-13
Payer: COMMERCIAL

## 2021-01-13 PROCEDURE — 99072 ADDL SUPL MATRL&STAF TM PHE: CPT

## 2021-01-13 PROCEDURE — 90837 PSYTX W PT 60 MINUTES: CPT

## 2021-02-05 ENCOUNTER — APPOINTMENT (OUTPATIENT)
Dept: PSYCHIATRY | Facility: CLINIC | Age: 26
End: 2021-02-05
Payer: COMMERCIAL

## 2021-02-05 PROCEDURE — 90837 PSYTX W PT 60 MINUTES: CPT

## 2021-02-05 PROCEDURE — 99072 ADDL SUPL MATRL&STAF TM PHE: CPT

## 2021-02-19 ENCOUNTER — APPOINTMENT (OUTPATIENT)
Dept: PSYCHIATRY | Facility: CLINIC | Age: 26
End: 2021-02-19
Payer: COMMERCIAL

## 2021-02-19 PROCEDURE — 90837 PSYTX W PT 60 MINUTES: CPT | Mod: 95

## 2021-03-04 ENCOUNTER — APPOINTMENT (OUTPATIENT)
Dept: PSYCHIATRY | Facility: CLINIC | Age: 26
End: 2021-03-04
Payer: COMMERCIAL

## 2021-03-04 PROCEDURE — 90837 PSYTX W PT 60 MINUTES: CPT | Mod: 95

## 2021-03-18 ENCOUNTER — APPOINTMENT (OUTPATIENT)
Dept: PSYCHIATRY | Facility: CLINIC | Age: 26
End: 2021-03-18
Payer: COMMERCIAL

## 2021-03-18 PROCEDURE — 99072 ADDL SUPL MATRL&STAF TM PHE: CPT

## 2021-03-18 PROCEDURE — 90837 PSYTX W PT 60 MINUTES: CPT

## 2021-04-07 ENCOUNTER — APPOINTMENT (OUTPATIENT)
Dept: PSYCHIATRY | Facility: CLINIC | Age: 26
End: 2021-04-07
Payer: COMMERCIAL

## 2021-04-07 PROCEDURE — 99072 ADDL SUPL MATRL&STAF TM PHE: CPT

## 2021-04-07 PROCEDURE — 90837 PSYTX W PT 60 MINUTES: CPT

## 2021-04-30 ENCOUNTER — APPOINTMENT (OUTPATIENT)
Dept: PSYCHIATRY | Facility: CLINIC | Age: 26
End: 2021-04-30
Payer: COMMERCIAL

## 2021-04-30 PROCEDURE — 90837 PSYTX W PT 60 MINUTES: CPT | Mod: 95

## 2021-05-07 ENCOUNTER — APPOINTMENT (OUTPATIENT)
Dept: PSYCHIATRY | Facility: CLINIC | Age: 26
End: 2021-05-07
Payer: COMMERCIAL

## 2021-05-07 PROCEDURE — 99214 OFFICE O/P EST MOD 30 MIN: CPT

## 2021-05-07 PROCEDURE — 99072 ADDL SUPL MATRL&STAF TM PHE: CPT

## 2021-05-14 ENCOUNTER — APPOINTMENT (OUTPATIENT)
Dept: PSYCHIATRY | Facility: CLINIC | Age: 26
End: 2021-05-14
Payer: COMMERCIAL

## 2021-05-14 PROCEDURE — 99072 ADDL SUPL MATRL&STAF TM PHE: CPT

## 2021-05-14 PROCEDURE — 90837 PSYTX W PT 60 MINUTES: CPT

## 2021-05-28 ENCOUNTER — APPOINTMENT (OUTPATIENT)
Dept: PSYCHIATRY | Facility: CLINIC | Age: 26
End: 2021-05-28
Payer: COMMERCIAL

## 2021-05-28 PROCEDURE — 90837 PSYTX W PT 60 MINUTES: CPT

## 2021-05-28 PROCEDURE — 99072 ADDL SUPL MATRL&STAF TM PHE: CPT

## 2021-06-18 ENCOUNTER — APPOINTMENT (OUTPATIENT)
Dept: PSYCHIATRY | Facility: CLINIC | Age: 26
End: 2021-06-18
Payer: COMMERCIAL

## 2021-06-18 PROCEDURE — 99072 ADDL SUPL MATRL&STAF TM PHE: CPT

## 2021-06-18 PROCEDURE — 90837 PSYTX W PT 60 MINUTES: CPT

## 2021-07-16 ENCOUNTER — APPOINTMENT (OUTPATIENT)
Dept: PSYCHIATRY | Facility: CLINIC | Age: 26
End: 2021-07-16

## 2021-07-22 ENCOUNTER — APPOINTMENT (OUTPATIENT)
Dept: PSYCHIATRY | Facility: CLINIC | Age: 26
End: 2021-07-22

## 2021-10-08 ENCOUNTER — APPOINTMENT (OUTPATIENT)
Dept: PSYCHIATRY | Facility: CLINIC | Age: 26
End: 2021-10-08
Payer: COMMERCIAL

## 2021-10-08 PROCEDURE — 90834 PSYTX W PT 45 MINUTES: CPT

## 2021-10-26 ENCOUNTER — APPOINTMENT (OUTPATIENT)
Dept: PSYCHIATRY | Facility: CLINIC | Age: 26
End: 2021-10-26
Payer: COMMERCIAL

## 2021-10-26 PROCEDURE — 90837 PSYTX W PT 60 MINUTES: CPT

## 2021-11-02 ENCOUNTER — APPOINTMENT (OUTPATIENT)
Dept: PSYCHIATRY | Facility: CLINIC | Age: 26
End: 2021-11-02

## 2021-11-09 ENCOUNTER — APPOINTMENT (OUTPATIENT)
Dept: PSYCHIATRY | Facility: CLINIC | Age: 26
End: 2021-11-09

## 2021-11-17 ENCOUNTER — APPOINTMENT (OUTPATIENT)
Dept: PSYCHIATRY | Facility: CLINIC | Age: 26
End: 2021-11-17
Payer: COMMERCIAL

## 2021-11-17 PROCEDURE — 90837 PSYTX W PT 60 MINUTES: CPT

## 2021-11-22 ENCOUNTER — APPOINTMENT (OUTPATIENT)
Dept: PSYCHIATRY | Facility: CLINIC | Age: 26
End: 2021-11-22

## 2021-11-24 ENCOUNTER — APPOINTMENT (OUTPATIENT)
Dept: PSYCHIATRY | Facility: CLINIC | Age: 26
End: 2021-11-24
Payer: COMMERCIAL

## 2021-11-24 PROCEDURE — 99215 OFFICE O/P EST HI 40 MIN: CPT

## 2021-11-24 RX ORDER — OXYBUTYNIN CHLORIDE 5 MG/1
5 TABLET ORAL
Qty: 30 | Refills: 2 | Status: COMPLETED | COMMUNITY
Start: 2019-09-04 | End: 2021-11-24

## 2021-11-29 ENCOUNTER — APPOINTMENT (OUTPATIENT)
Dept: PSYCHIATRY | Facility: CLINIC | Age: 26
End: 2021-11-29
Payer: COMMERCIAL

## 2021-11-29 PROCEDURE — 90837 PSYTX W PT 60 MINUTES: CPT

## 2021-12-06 ENCOUNTER — APPOINTMENT (OUTPATIENT)
Dept: PSYCHIATRY | Facility: CLINIC | Age: 26
End: 2021-12-06
Payer: COMMERCIAL

## 2021-12-06 PROCEDURE — 90837 PSYTX W PT 60 MINUTES: CPT

## 2021-12-13 ENCOUNTER — APPOINTMENT (OUTPATIENT)
Dept: PSYCHIATRY | Facility: CLINIC | Age: 26
End: 2021-12-13
Payer: COMMERCIAL

## 2021-12-13 PROCEDURE — 90837 PSYTX W PT 60 MINUTES: CPT

## 2021-12-20 ENCOUNTER — APPOINTMENT (OUTPATIENT)
Dept: PSYCHIATRY | Facility: CLINIC | Age: 26
End: 2021-12-20
Payer: COMMERCIAL

## 2021-12-20 PROCEDURE — 90837 PSYTX W PT 60 MINUTES: CPT

## 2021-12-27 ENCOUNTER — APPOINTMENT (OUTPATIENT)
Dept: PSYCHIATRY | Facility: CLINIC | Age: 26
End: 2021-12-27
Payer: COMMERCIAL

## 2021-12-27 PROCEDURE — 90837 PSYTX W PT 60 MINUTES: CPT | Mod: 95

## 2022-01-03 ENCOUNTER — APPOINTMENT (OUTPATIENT)
Dept: PSYCHIATRY | Facility: CLINIC | Age: 27
End: 2022-01-03
Payer: COMMERCIAL

## 2022-01-03 PROCEDURE — 90837 PSYTX W PT 60 MINUTES: CPT | Mod: 95

## 2022-01-10 ENCOUNTER — APPOINTMENT (OUTPATIENT)
Dept: PSYCHIATRY | Facility: CLINIC | Age: 27
End: 2022-01-10
Payer: COMMERCIAL

## 2022-01-10 PROCEDURE — 90837 PSYTX W PT 60 MINUTES: CPT

## 2022-01-24 ENCOUNTER — APPOINTMENT (OUTPATIENT)
Dept: PSYCHIATRY | Facility: CLINIC | Age: 27
End: 2022-01-24
Payer: COMMERCIAL

## 2022-01-24 PROCEDURE — 90837 PSYTX W PT 60 MINUTES: CPT | Mod: 95

## 2022-01-31 ENCOUNTER — APPOINTMENT (OUTPATIENT)
Dept: PSYCHIATRY | Facility: CLINIC | Age: 27
End: 2022-01-31
Payer: COMMERCIAL

## 2022-01-31 PROCEDURE — 90837 PSYTX W PT 60 MINUTES: CPT

## 2022-02-07 ENCOUNTER — APPOINTMENT (OUTPATIENT)
Dept: PSYCHIATRY | Facility: CLINIC | Age: 27
End: 2022-02-07
Payer: COMMERCIAL

## 2022-02-07 PROCEDURE — 90837 PSYTX W PT 60 MINUTES: CPT

## 2022-02-08 ENCOUNTER — OUTPATIENT (OUTPATIENT)
Dept: OUTPATIENT SERVICES | Facility: HOSPITAL | Age: 27
LOS: 1 days | Discharge: HOME | End: 2022-02-08
Payer: COMMERCIAL

## 2022-02-08 PROCEDURE — 99214 OFFICE O/P EST MOD 30 MIN: CPT | Mod: 95

## 2022-02-08 PROCEDURE — 90839 PSYTX CRISIS INITIAL 60 MIN: CPT

## 2022-02-09 ENCOUNTER — NON-APPOINTMENT (OUTPATIENT)
Age: 27
End: 2022-02-09

## 2022-02-10 ENCOUNTER — NON-APPOINTMENT (OUTPATIENT)
Age: 27
End: 2022-02-10

## 2022-02-10 DIAGNOSIS — F60.6 AVOIDANT PERSONALITY DISORDER: ICD-10-CM

## 2022-02-10 DIAGNOSIS — F32.A DEPRESSION, UNSPECIFIED: ICD-10-CM

## 2022-02-14 ENCOUNTER — APPOINTMENT (OUTPATIENT)
Dept: PSYCHIATRY | Facility: CLINIC | Age: 27
End: 2022-02-14
Payer: COMMERCIAL

## 2022-02-14 PROCEDURE — 90837 PSYTX W PT 60 MINUTES: CPT

## 2022-02-23 ENCOUNTER — APPOINTMENT (OUTPATIENT)
Dept: PSYCHIATRY | Facility: CLINIC | Age: 27
End: 2022-02-23

## 2022-02-23 ENCOUNTER — APPOINTMENT (OUTPATIENT)
Dept: PSYCHIATRY | Facility: CLINIC | Age: 27
End: 2022-02-23
Payer: COMMERCIAL

## 2022-02-23 PROCEDURE — 99214 OFFICE O/P EST MOD 30 MIN: CPT

## 2022-02-28 ENCOUNTER — APPOINTMENT (OUTPATIENT)
Dept: PSYCHIATRY | Facility: CLINIC | Age: 27
End: 2022-02-28
Payer: COMMERCIAL

## 2022-02-28 PROCEDURE — 90837 PSYTX W PT 60 MINUTES: CPT

## 2022-03-07 ENCOUNTER — APPOINTMENT (OUTPATIENT)
Dept: PSYCHIATRY | Facility: CLINIC | Age: 27
End: 2022-03-07
Payer: COMMERCIAL

## 2022-03-07 PROCEDURE — 90837 PSYTX W PT 60 MINUTES: CPT

## 2022-03-14 ENCOUNTER — APPOINTMENT (OUTPATIENT)
Dept: PSYCHIATRY | Facility: CLINIC | Age: 27
End: 2022-03-14
Payer: COMMERCIAL

## 2022-03-14 PROCEDURE — 90837 PSYTX W PT 60 MINUTES: CPT

## 2022-03-21 ENCOUNTER — APPOINTMENT (OUTPATIENT)
Dept: PSYCHIATRY | Facility: CLINIC | Age: 27
End: 2022-03-21
Payer: COMMERCIAL

## 2022-03-21 PROCEDURE — 90837 PSYTX W PT 60 MINUTES: CPT

## 2022-03-21 PROCEDURE — 99214 OFFICE O/P EST MOD 30 MIN: CPT

## 2022-03-28 ENCOUNTER — APPOINTMENT (OUTPATIENT)
Dept: PSYCHIATRY | Facility: CLINIC | Age: 27
End: 2022-03-28
Payer: COMMERCIAL

## 2022-03-28 PROCEDURE — 90837 PSYTX W PT 60 MINUTES: CPT

## 2022-04-04 ENCOUNTER — APPOINTMENT (OUTPATIENT)
Dept: PSYCHIATRY | Facility: CLINIC | Age: 27
End: 2022-04-04
Payer: COMMERCIAL

## 2022-04-04 PROCEDURE — 90837 PSYTX W PT 60 MINUTES: CPT

## 2022-04-11 ENCOUNTER — APPOINTMENT (OUTPATIENT)
Dept: PSYCHIATRY | Facility: CLINIC | Age: 27
End: 2022-04-11
Payer: COMMERCIAL

## 2022-04-11 PROCEDURE — 90837 PSYTX W PT 60 MINUTES: CPT

## 2022-04-18 ENCOUNTER — APPOINTMENT (OUTPATIENT)
Dept: PSYCHIATRY | Facility: CLINIC | Age: 27
End: 2022-04-18
Payer: COMMERCIAL

## 2022-04-18 PROCEDURE — 90837 PSYTX W PT 60 MINUTES: CPT

## 2022-04-25 ENCOUNTER — APPOINTMENT (OUTPATIENT)
Dept: PSYCHIATRY | Facility: CLINIC | Age: 27
End: 2022-04-25
Payer: COMMERCIAL

## 2022-04-25 PROCEDURE — 90837 PSYTX W PT 60 MINUTES: CPT

## 2022-04-25 PROCEDURE — 99214 OFFICE O/P EST MOD 30 MIN: CPT

## 2022-05-16 ENCOUNTER — APPOINTMENT (OUTPATIENT)
Dept: PSYCHIATRY | Facility: CLINIC | Age: 27
End: 2022-05-16

## 2022-05-18 ENCOUNTER — APPOINTMENT (OUTPATIENT)
Dept: PSYCHIATRY | Facility: CLINIC | Age: 27
End: 2022-05-18

## 2022-05-23 ENCOUNTER — APPOINTMENT (OUTPATIENT)
Dept: PSYCHIATRY | Facility: CLINIC | Age: 27
End: 2022-05-23
Payer: COMMERCIAL

## 2022-05-23 PROCEDURE — 90837 PSYTX W PT 60 MINUTES: CPT

## 2022-05-27 ENCOUNTER — APPOINTMENT (OUTPATIENT)
Dept: PSYCHIATRY | Facility: CLINIC | Age: 27
End: 2022-05-27
Payer: COMMERCIAL

## 2022-05-27 PROCEDURE — 99214 OFFICE O/P EST MOD 30 MIN: CPT

## 2022-05-27 RX ORDER — BUPROPION HYDROCHLORIDE 75 MG/1
75 TABLET, FILM COATED ORAL
Qty: 2 | Refills: 0 | Status: COMPLETED | COMMUNITY
Start: 2022-04-25 | End: 2022-05-27

## 2022-05-27 RX ORDER — ESCITALOPRAM OXALATE 5 MG/1
5 TABLET ORAL DAILY
Qty: 30 | Refills: 0 | Status: COMPLETED | COMMUNITY
Start: 2022-03-21 | End: 2022-05-27

## 2022-06-06 ENCOUNTER — APPOINTMENT (OUTPATIENT)
Dept: PSYCHIATRY | Facility: CLINIC | Age: 27
End: 2022-06-06
Payer: COMMERCIAL

## 2022-06-06 PROCEDURE — 90837 PSYTX W PT 60 MINUTES: CPT

## 2022-06-13 ENCOUNTER — APPOINTMENT (OUTPATIENT)
Dept: PSYCHIATRY | Facility: CLINIC | Age: 27
End: 2022-06-13
Payer: COMMERCIAL

## 2022-06-13 PROCEDURE — 90837 PSYTX W PT 60 MINUTES: CPT

## 2022-06-20 ENCOUNTER — APPOINTMENT (OUTPATIENT)
Dept: PSYCHIATRY | Facility: CLINIC | Age: 27
End: 2022-06-20

## 2022-06-27 ENCOUNTER — APPOINTMENT (OUTPATIENT)
Dept: PSYCHIATRY | Facility: CLINIC | Age: 27
End: 2022-06-27
Payer: COMMERCIAL

## 2022-06-27 PROCEDURE — 90837 PSYTX W PT 60 MINUTES: CPT

## 2022-07-05 ENCOUNTER — APPOINTMENT (OUTPATIENT)
Dept: PSYCHIATRY | Facility: CLINIC | Age: 27
End: 2022-07-05

## 2022-07-05 PROCEDURE — 99214 OFFICE O/P EST MOD 30 MIN: CPT

## 2022-07-11 ENCOUNTER — APPOINTMENT (OUTPATIENT)
Dept: PSYCHIATRY | Facility: CLINIC | Age: 27
End: 2022-07-11

## 2022-07-11 PROCEDURE — 90837 PSYTX W PT 60 MINUTES: CPT

## 2022-07-12 ENCOUNTER — APPOINTMENT (OUTPATIENT)
Dept: PSYCHIATRY | Facility: CLINIC | Age: 27
End: 2022-07-12

## 2022-07-18 ENCOUNTER — APPOINTMENT (OUTPATIENT)
Dept: PSYCHIATRY | Facility: CLINIC | Age: 27
End: 2022-07-18

## 2022-07-18 PROCEDURE — 90837 PSYTX W PT 60 MINUTES: CPT

## 2022-07-25 ENCOUNTER — APPOINTMENT (OUTPATIENT)
Dept: PSYCHIATRY | Facility: CLINIC | Age: 27
End: 2022-07-25

## 2022-07-25 PROCEDURE — 90837 PSYTX W PT 60 MINUTES: CPT

## 2022-08-01 ENCOUNTER — APPOINTMENT (OUTPATIENT)
Dept: PSYCHIATRY | Facility: CLINIC | Age: 27
End: 2022-08-01

## 2022-08-01 PROCEDURE — 99214 OFFICE O/P EST MOD 30 MIN: CPT

## 2022-08-01 PROCEDURE — 90837 PSYTX W PT 60 MINUTES: CPT

## 2022-08-08 ENCOUNTER — APPOINTMENT (OUTPATIENT)
Dept: PSYCHIATRY | Facility: CLINIC | Age: 27
End: 2022-08-08

## 2022-08-08 PROCEDURE — 90837 PSYTX W PT 60 MINUTES: CPT

## 2022-08-15 ENCOUNTER — APPOINTMENT (OUTPATIENT)
Dept: PSYCHIATRY | Facility: CLINIC | Age: 27
End: 2022-08-15

## 2022-08-15 PROCEDURE — 90837 PSYTX W PT 60 MINUTES: CPT

## 2022-08-22 ENCOUNTER — APPOINTMENT (OUTPATIENT)
Dept: PSYCHIATRY | Facility: CLINIC | Age: 27
End: 2022-08-22

## 2022-08-22 PROCEDURE — 90837 PSYTX W PT 60 MINUTES: CPT

## 2022-08-29 ENCOUNTER — APPOINTMENT (OUTPATIENT)
Dept: PSYCHIATRY | Facility: CLINIC | Age: 27
End: 2022-08-29

## 2022-08-29 PROCEDURE — 90837 PSYTX W PT 60 MINUTES: CPT

## 2022-09-12 ENCOUNTER — APPOINTMENT (OUTPATIENT)
Dept: PSYCHIATRY | Facility: CLINIC | Age: 27
End: 2022-09-12

## 2022-09-12 PROCEDURE — 90837 PSYTX W PT 60 MINUTES: CPT | Mod: 95

## 2022-09-19 ENCOUNTER — APPOINTMENT (OUTPATIENT)
Dept: PSYCHIATRY | Facility: CLINIC | Age: 27
End: 2022-09-19

## 2022-09-19 PROCEDURE — 90837 PSYTX W PT 60 MINUTES: CPT

## 2022-09-20 NOTE — PHYSICAL EXAM
[Cooperative] : cooperative [Depressed] : depressed [Flat] : flat [Clear] : clear [Linear/Goal Directed] : linear/goal directed [Depressive] : depressive [None Reported] : none reported [Average] : average [WNL] : within normal limits

## 2022-09-20 NOTE — RISK ASSESSMENT
[Yes, patient reports ideation or behavior] : Yes, patient reports ideation or behavior [No, patient denies ideation or behavior] : No, patient denies ideation or behavior [Yes] : Yes [No] : No [Depressed mood/Anhedonia] : depressed mood/anhedonia [Hopelessness or despair] : hopelessness or despair [Triggering events leading to humiliation, shame, and/or despair] : triggering events leading to humiliation, shame, and/or despair (e.g. loss of relationship, financial or health status) (real or anticipated) [Supportive social network of family or friends] : supportive social network of family or friends [Positive therapeutic relationships] : positive therapeutic relationships [Frustration tolerance] : frustration tolerance [No known risk factors] : No known risk factors

## 2022-09-21 NOTE — END OF VISIT
[Duration of Psychotherapy Visit (minutes spent in synchronous communication): ____] : Duration of Psychotherapy Visit (minutes spent in synchronous communication): [unfilled] [Individual Psychotherapy for 53+ minutes] : Individual Psychotherapy for 53+ minutes

## 2022-09-21 NOTE — PLAN
[Council Therapy] : Council Therapy  [Psychodynamic Therapy] : Psychodynamic Therapy  [Supportive Therapy] : Supportive Therapy [Other: ____] : [unfilled] [FreeTextEntry2] : Increased understanding of anxious feelings\par Learn relaxation techniques to reduce anxiety (thought stopping, thought switching, creative visualization, progressive muscle relaxation, deep breathing)\par Learn about PTSD and healing childhood attachment injuries (IFS)\par  [de-identified] : Pt. was engaged in session; affect and mood WNL. This session pt. and SW discussed and processed his feelings of despair, hopelessness and self condemnation. Pt. spoke about not wanting to engage with others, and part of him believes that other people will "reject and  him negatively." Pt. shared that having lost his job "confirmed his beliefs about himself." SW empathized with patients beliefs about himself and utilized IFS to help pt. better understand and witness this part of him.

## 2022-10-03 ENCOUNTER — APPOINTMENT (OUTPATIENT)
Dept: PSYCHIATRY | Facility: CLINIC | Age: 27
End: 2022-10-03

## 2022-10-03 PROCEDURE — 90837 PSYTX W PT 60 MINUTES: CPT

## 2022-10-03 PROCEDURE — 99214 OFFICE O/P EST MOD 30 MIN: CPT

## 2022-10-03 RX ORDER — BUPROPION HYDROCHLORIDE 150 MG/1
150 TABLET, EXTENDED RELEASE ORAL
Qty: 30 | Refills: 0 | Status: COMPLETED | COMMUNITY
Start: 2022-06-28

## 2022-10-06 NOTE — PLAN
[FreeTextEntry2] : Increased understanding of anxious feelings\par Learn relaxation techniques to reduce anxiety (thought stopping, thought switching, creative visualization, progressive muscle relaxation, deep breathing)\par Learn about PTSD and healing childhood attachment injuries (IFS)\par  [Interpersonal Therapy] : Interpersonal Therapy  [Psychodynamic Therapy] : Psychodynamic Therapy  [Psychoeducation] : Psychoeducation  [Skills training (all types)] : Skills training (all types)  [Supportive Therapy] : Supportive Therapy [Other: ____] : [unfilled] [de-identified] : Pt. was engaged in session; affect flat and mood depressed. This session pt. and SW discussed and processed the part of him that feels hopeless, helpless and suicidal. Pt. spoke about the the thoughts he has of himself, his future and the struggle he experiences in finding a purpose for himself. Pt. also spoke about his mother, and how he is affected by his mothers mental health. Pt. reports his mother is "nervous, anxious and easily overwhelmed" and does his best to not contribute to her emotional distress. SW empathized with pt. and explored the ways pt. has had to adapt to his family dynamics.  [Recommended Frequency of Visits: ____] : Recommended frequency of visits: [unfilled] [Return in ____ week(s)] : Return in [unfilled] week(s) [FreeTextEntry1] : Meet for weekly individual counseling, level of care is appropriate. Pt. has a safety plan in case of emergency.

## 2022-10-10 ENCOUNTER — APPOINTMENT (OUTPATIENT)
Dept: PSYCHIATRY | Facility: CLINIC | Age: 27
End: 2022-10-10

## 2022-10-10 PROCEDURE — 90837 PSYTX W PT 60 MINUTES: CPT

## 2022-10-11 NOTE — PLAN
[Recommended Frequency of Visits: ____] : Recommended frequency of visits: [unfilled] [Return in ____ week(s)] : Return in [unfilled] week(s) [FreeTextEntry2] : Increased understanding of anxious feelings\par Learn relaxation techniques to reduce anxiety (thought stopping, thought switching, creative visualization, progressive muscle relaxation, deep breathing)\par Learn about PTSD and healing childhood attachment injuries (IFS)\par  [Interpersonal Therapy] : Interpersonal Therapy  [Psychodynamic Therapy] : Psychodynamic Therapy  [Supportive Therapy] : Supportive Therapy [Other: ____] : [unfilled] [de-identified] : Pt. was engaged in session; affect flat and mood depressed. This session pt. and SW discussed and processed his suicidal ideations. Pt. spoke about his relationship with his parents, and how he does not want to "cause them more stress and grief" if he were to harm himself. Pt. denies having any plans to hurt himself, and reports that he would not hurt himself, and if he does will contact the suicide hotline. Pt. spoke about his parents and how he has had to cope with his mothers anxiety and stress, and the disappointment he feels he is to them. SW empathized with pt. and explored the part of him that feels hopeless and nihilistic.  [FreeTextEntry1] : Meet for weekly individual counseling, level of care is appropriate. Pt. has a safety plan in case of emergency.

## 2022-10-17 ENCOUNTER — APPOINTMENT (OUTPATIENT)
Dept: PSYCHIATRY | Facility: CLINIC | Age: 27
End: 2022-10-17

## 2022-10-17 PROCEDURE — 90837 PSYTX W PT 60 MINUTES: CPT

## 2022-10-18 NOTE — PLAN
[Recommended Frequency of Visits: ____] : Recommended frequency of visits: [unfilled] [Return in ____ week(s)] : Return in [unfilled] week(s) [FreeTextEntry2] : Increased understanding of anxious feelings\par Learn relaxation techniques to reduce anxiety (thought stopping, thought switching, creative visualization, progressive muscle relaxation, deep breathing)\par Learn about PTSD and healing childhood attachment injuries (IFS)\par  [Interpersonal Therapy] : Interpersonal Therapy  [Psychodynamic Therapy] : Psychodynamic Therapy  [Psychoeducation] : Psychoeducation  [Supportive Therapy] : Supportive Therapy [Other: ____] : [unfilled] [de-identified] : Pt. was engaged in session; affect flat and mood depressed. This session pt. and SW discussed and processed the part of him that has suicidal thoughts and fantasies. Pt. spoke about the suffering he lives with on a daily basis, and the lack of stimulus he has in his life. Pt. reports that since being fired from his job, he has been spending most of his time at home, either sleeping, applying for job or playing video games. Pt. reports that he does not have motivation to take care of his physical and mental health, and believes there is nothing that can be done for him to change that. SW empathized with pt. and explored the hopelessness and despair he feels and the underlying anger that he may be repressing.  [FreeTextEntry1] : Meet for weekly individual counseling, level of care is appropriate. Pt. has a safety plan in case of emergency.

## 2022-10-18 NOTE — RISK ASSESSMENT
[Yes, patient reports ideation or behavior] : Yes, patient reports ideation or behavior [No, patient denies ideation or behavior] : No, patient denies ideation or behavior [Yes] : Yes [No] : No [Depressed mood/Anhedonia] : depressed mood/anhedonia [Hopelessness or despair] : hopelessness or despair [Triggering events leading to humiliation, shame, and/or despair] : triggering events leading to humiliation, shame, and/or despair (e.g. loss of relationship, financial or health status) (real or anticipated) [Supportive social network of family or friends] : supportive social network of family or friends [Positive therapeutic relationships] : positive therapeutic relationships [Frustration tolerance] : frustration tolerance [No known risk factors] : No known risk factors [FreeTextEntry8] : Safety Plan created

## 2022-10-31 ENCOUNTER — APPOINTMENT (OUTPATIENT)
Dept: PSYCHIATRY | Facility: CLINIC | Age: 27
End: 2022-10-31

## 2022-10-31 PROCEDURE — 90834 PSYTX W PT 45 MINUTES: CPT

## 2022-10-31 PROCEDURE — 99214 OFFICE O/P EST MOD 30 MIN: CPT

## 2022-10-31 NOTE — PAST MEDICAL HISTORY
[FreeTextEntry1] : At the beginning of college the patient went to the [employer] [facility] [place]. Prior to that while at [employer] he felt isolative upset and kept to himself. Someone asked him what was going on he threatened to kill them. Because of that he was suspended. He had to see a therapist until he could go back to school. At [place] he felt stressed out depressed and couldn't function. He felt overwhelmed and thought that everybody around him was better. The patient had some passive thoughts of self-harm wishing he would just disappear. Patient was started on Zoloft was titrated up to 200 mg. His primary care physician is now titrating it down and the patient is 100 mg.

## 2022-10-31 NOTE — PAST MEDICAL HISTORY
[FreeTextEntry1] : At the beginning of college the patient went to the [employer] [facility] [place]. Prior to that while at [employer] he felt isolative upset and kept to himself. Someone asked him what was going on he threatened to kill them. Because of that he was suspended. He had to see a therapist until he could go back to school. At [place] he felt stressed out depressed and couldn't function. He felt overwhelmed and thought that everybody around him was better. The patient had some passive thoughts of self-harm wishing he would just disappear. Patient was started on Zoloft was titrated up to 200 mg. His primary care physician is now titrating it down and the patient is 100 mg.

## 2022-10-31 NOTE — PHYSICAL EXAM
[None] : none [Anxious] : anxious [Constricted] : constricted [Normal] : normal [Fair] : fair [Dysphoric] : not dysphoric [FreeTextEntry1] : Overweight [FreeTextEntry8] : "I feel numb."

## 2022-10-31 NOTE — FAMILY HISTORY
[FreeTextEntry1] : Patient was born in [place]. Mother 52 is a cartographer. Father 55 is a . He has no brothers or sisters. His maternal cousin who had mood swings and her maternal grandmother who was bipolar. There is no other psychiatric alcohol or drug history in the family there is no abuse history growing up.

## 2022-10-31 NOTE — SOCIAL HISTORY
[FreeTextEntry1] : Patient grew up in [place]. He attended [employer]. He graduated in 2009. He was an honor student. He participated no activities or sports. He still has 3 friends from [employer] school that he sees on a regular basis. The patient then went to [employer] graduating in 2013. His grades were in the 90s. He participated no activities and had no friends. Patient currently is student at [employer] in [place]. He has a 3.2 grade point average in biochemistry. He has no friends. The patient plays video games. He currently has a purple belt and Chinese martial arts.

## 2022-10-31 NOTE — HISTORY OF PRESENT ILLNESS
[de-identified] : Patient is here in the office for face to face interview with writer for 2 month follow-up visit.\par \par \par \par No medication change last month. Patient states he was fired from his job on Sept 7 due to being in the bathroom for a long period of time. "Nothing matters anymore. All this psychiatry we are doing don't work. I don’t care about anything or anyone." Seen to be apathetic, irritable, angry, depressed. and anxious.  Grabbing his hair with his fingers like he wants to pull them all out. \par \par Sleep: increased. No structure in the life.\par Appetite: same. \par Energy, concentration and motivation are all decreased. Denies any AVH, SI or HI. Sees Stephane for therapy. \par  [de-identified] : Patient is a 19-year-old male single currently a student in college living with his parents. Patient has never seen a psychiatrist before. Patient received medication from his primary care physician. Patient currently none therapy. Patient states there are occasions when he feels worthless and not worth other people's time. He feels that they can spend her time better elsewhere doing things with other people. His feelings are sporadic and might only last per day. Recently happen in the college lab class. Patient had to go the bathroom and arrived in the class late. When he arrived other people had already started and he did not know what was going on. He felt too embarrassed to want to stop people. He felt that if he asked what was going on he didn't deserve it he felt like he would be a leech. Patient feels symptoms began about 5 years ago during his freshman year of high school. Patient is in a very small grammar school but then went to high school with her so many people. He felt like "a fish out of water." He remembers an occasion where he tried to talk some classmates and they responded with "why are you talking to us." The patient felt embarrassed and guilty and never spoke to anyone or had any friends in high school after that he was very secluded and isolative.\par \par The patient gets up at about 6:30 in the morning he attends school anywhere from 8 in the morning until 4 in the afternoon. Patient has a job working in a [employer] and evening she'll come home play video games to return to bed about midnight. Sometimes he falls asleep right away sometimes it might take an hour. Once asleep he stays asleep. Appetite is good energy is somewhat low at times he is very isolative he only has 3 friends they sent from [employer] school.

## 2022-10-31 NOTE — PHYSICAL EXAM
[None] : none [Anxious] : anxious [Dysphoric] : not dysphoric [Constricted] : constricted [Normal] : normal [Fair] : fair [FreeTextEntry1] : Overweight [FreeTextEntry8] : "I feel numb."

## 2022-10-31 NOTE — CURRENT PSYCHIATRIC SYMPTOMS
[Depressed Mood] : no depressed mood [Anhedonia] : no anhedonia [de-identified] : improved [de-identified] : denied [de-identified] : denied [de-identified] : improved [de-identified] : none [de-identified] : none [de-identified] : none

## 2022-10-31 NOTE — DISCUSSION/SUMMARY
[FreeTextEntry1] : Assessment: Patient is a 26-year-old male with h/o anxiety and depression seen today for medication management. Patient is compliant with his medications, tolerating them well without any side effects. I-STOP was checked without any issues. \par \par \par PLAN:\par Continue Wellbutrin 300 mg PO QAM for depression, low motivation, concentration energy, and SSRI induced sexual dysfunction \par Increase Lexapro 15 to 20 mg PO QD for depression and anxiety. \par - Discussed risks and benefits of medications including side effects of GI and sexual side effects with SSRI. Alternative strategies including no intervention discussed with patient. Patient consents to current medications as prescribed\par - Patient understands to contact clinic prn with concerns and agrees to call 911 or go to nearest ER if symptoms worsen.\par - Next appointment made in 1 month. Patient left the office without any distress.\par \par

## 2022-10-31 NOTE — HISTORY OF PRESENT ILLNESS
[de-identified] : Last month Lexapro 10 mg was increased to 15 mg. Patient does not feel it changed much. Since he was fired from his job on Sept 7, he has been actively looking but has not found a job currently. 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 \par \par Parents feel it is more positive. They feel he is more optimistic about his future. \par \par Sleep: 8-9 hours. No structure in the life.\par Appetite: good\par Energy, concentration and motivation are all decreased. Denies any AVH, SI or HI. Sees Stephane for therapy. \par  [de-identified] : Patient is a 19-year-old male single currently a student in college living with his parents. Patient has never seen a psychiatrist before. Patient received medication from his primary care physician. Patient currently none therapy. Patient states there are occasions when he feels worthless and not worth other people's time. He feels that they can spend her time better elsewhere doing things with other people. His feelings are sporadic and might only last per day. Recently happen in the college lab class. Patient had to go the bathroom and arrived in the class late. When he arrived other people had already started and he did not know what was going on. He felt too embarrassed to want to stop people. He felt that if he asked what was going on he didn't deserve it he felt like he would be a leech. Patient feels symptoms began about 5 years ago during his freshman year of high school. Patient is in a very small grammar school but then went to high school with her so many people. He felt like "a fish out of water." He remembers an occasion where he tried to talk some classmates and they responded with "why are you talking to us." The patient felt embarrassed and guilty and never spoke to anyone or had any friends in high school after that he was very secluded and isolative.\par \par The patient gets up at about 6:30 in the morning he attends school anywhere from 8 in the morning until 4 in the afternoon. Patient has a job working in a [employer] and evening she'll come home play video games to return to bed about midnight. Sometimes he falls asleep right away sometimes it might take an hour. Once asleep he stays asleep. Appetite is good energy is somewhat low at times he is very isolative he only has 3 friends they sent from [employer].

## 2022-10-31 NOTE — CURRENT PSYCHIATRIC SYMPTOMS
[Depressed Mood] : no depressed mood [Anhedonia] : no anhedonia [de-identified] : improved [de-identified] : denied [de-identified] : denied [de-identified] : improved [de-identified] : none [de-identified] : none [de-identified] : none

## 2022-10-31 NOTE — FAMILY HISTORY
[FreeTextEntry1] : Patient was born in [place]. Mother 52 is a cartographer. Father 55 is a . He has no brothers or sisters. His maternal cousin who had mood swings and her maternal grandmother who was bipolar. There is no other psychiatric alcohol or drug history in the family there is no abuse history growing up.

## 2022-10-31 NOTE — SOCIAL HISTORY
[FreeTextEntry1] : Patient grew up in [place]. He attended [employer]. He graduated in 2009. He was an honor student. He participated no activities or sports. He still has 3 friends from [employer] school that he sees on a regular basis. The patient then went to [employer] graduating in 2013. His grades were in the 90s. He participated no activities and had no friends. Patient currently is student at [employer] in [place]. He has a 3.2 grade point average in biochemistry. He has no friends. The patient plays video games. He currently has a purple belt and Chinese martial arts.

## 2022-11-01 NOTE — RISK ASSESSMENT
[No, patient denies ideation or behavior] : No, patient denies ideation or behavior [Yes] : Yes [No] : No [Depressed mood/Anhedonia] : depressed mood/anhedonia [Hopelessness or despair] : hopelessness or despair [Triggering events leading to humiliation, shame, and/or despair] : triggering events leading to humiliation, shame, and/or despair (e.g. loss of relationship, financial or health status) (real or anticipated) [Supportive social network of family or friends] : supportive social network of family or friends [Positive therapeutic relationships] : positive therapeutic relationships [Frustration tolerance] : frustration tolerance [No known risk factors] : No known risk factors [FreeTextEntry8] : Safety Plan reviewed

## 2022-11-01 NOTE — PLAN
[FreeTextEntry2] : Increased understanding of anxious feelings\par Learn relaxation techniques to reduce anxiety (thought stopping, thought switching, creative visualization, progressive muscle relaxation, deep breathing)\par Learn about PTSD and healing childhood attachment injuries (IFS)\par  [Psychodynamic Therapy] : Psychodynamic Therapy  [Supportive Therapy] : Supportive Therapy [Other: ____] : [unfilled] [de-identified] : Pt. was engaged in session; affect flat and mood depressed. This session pt. and SW discussed and processed the part of him that feels hopeless and despondent. Pt. reports that over the weekend he attended a "party" in which he enjoyed himself. Pt. described the details of this event, and how surprised he was that he had a "good time." Pt. reports that this experience does not change his mind regarding his belief that his life has no meaning. SW empathized with pt. and explored how he felt socializing and being around his peers, and if he can engage in activities that include socialization.  [Recommended Frequency of Visits: ____] : Recommended frequency of visits: [unfilled] [Return in ____ week(s)] : Return in [unfilled] week(s) [FreeTextEntry1] : Meet for weekly individual counseling, level of care is appropriate. Pt. has a safety plan in case of emergency.

## 2022-11-07 ENCOUNTER — APPOINTMENT (OUTPATIENT)
Dept: PSYCHIATRY | Facility: CLINIC | Age: 27
End: 2022-11-07

## 2022-11-07 ENCOUNTER — RX RENEWAL (OUTPATIENT)
Age: 27
End: 2022-11-07

## 2022-11-07 PROCEDURE — 90837 PSYTX W PT 60 MINUTES: CPT

## 2022-11-07 RX ORDER — ESCITALOPRAM OXALATE 5 MG/1
5 TABLET ORAL DAILY
Qty: 30 | Refills: 0 | Status: COMPLETED | COMMUNITY
Start: 2022-10-03 | End: 2022-11-07

## 2022-11-08 NOTE — PLAN
[FreeTextEntry2] : Increased understanding of anxious feelings\par Learn relaxation techniques to reduce anxiety (thought stopping, thought switching, creative visualization, progressive muscle relaxation, deep breathing)\par Learn about PTSD and healing childhood attachment injuries (IFS)\par  [Psychodynamic Therapy] : Psychodynamic Therapy  [Supportive Therapy] : Supportive Therapy [Other: ____] : [unfilled] [de-identified] : Pt. was engaged in session; affect flat and mood depressed. This session pt. and SW discussed and processed the part of him that feels hopeless and despondent. Pt. reports that his relationship with his mother is "distressing." Pt. spoke about the part of him that wants to please his mother and reassure her that he can take care of himself; while another part of him feels resentful and frustrated with her for being "anxious and generally nervous." SW empathized with pt. and explored the ways he has adapted to his family system and his parents' temperament.  [Recommended Frequency of Visits: ____] : Recommended frequency of visits: [unfilled] [Return in ____ week(s)] : Return in [unfilled] week(s) [FreeTextEntry1] : Meet for weekly individual counseling, level of care is appropriate. Pt. has a safety plan in case of emergency.

## 2022-11-14 ENCOUNTER — APPOINTMENT (OUTPATIENT)
Dept: PSYCHIATRY | Facility: CLINIC | Age: 27
End: 2022-11-14

## 2022-11-14 PROCEDURE — 90837 PSYTX W PT 60 MINUTES: CPT

## 2022-11-15 NOTE — PLAN
[Recommended Frequency of Visits: ____] : Recommended frequency of visits: [unfilled] [Return in ____ week(s)] : Return in [unfilled] week(s) [Interpersonal Therapy] : Interpersonal Therapy  [Psychodynamic Therapy] : Psychodynamic Therapy  [Psychoeducation] : Psychoeducation  [Supportive Therapy] : Supportive Therapy [Other: ____] : [unfilled] [FreeTextEntry2] : Increased understanding of anxious feelings\par Learn relaxation techniques to reduce anxiety (thought stopping, thought switching, creative visualization, progressive muscle relaxation, deep breathing)\par Learn about PTSD and healing childhood attachment injuries (IFS)\par  [de-identified] : Pt. was engaged in session; affect flat and mood depressed. This session focused on the part of patient that feels suicidal, hopeless and angry. Pt. spoke about his past, and was tearful when he shared his aspirations and dreams when he was a younger man, in particular in High School. Pt. reports he wanted to be a doctor or a , and did very well academically. Pt. reports that despite his interest in these fields, another part of him "did not care" about trying to apply his strengths and interests. SW empathized with pt. and explored this part of him that carries shame from his perceived failures. Pt. denies plan to hurt himself and others.   [FreeTextEntry1] : Meet for weekly individual counseling, level of care is appropriate. Pt. has a safety plan in case of emergency.

## 2022-11-21 ENCOUNTER — APPOINTMENT (OUTPATIENT)
Dept: PSYCHIATRY | Facility: CLINIC | Age: 27
End: 2022-11-21

## 2022-11-21 PROCEDURE — 90837 PSYTX W PT 60 MINUTES: CPT

## 2022-11-22 NOTE — PLAN
[FreeTextEntry2] : Increased understanding of anxious feelings\par Learn relaxation techniques to reduce anxiety (thought stopping, thought switching, creative visualization, progressive muscle relaxation, deep breathing)\par Learn about PTSD and healing childhood attachment injuries (IFS)\par  [Acceptance and Commitment Therapy] : Acceptance and Commitment Therapy  [Supportive Therapy] : Supportive Therapy [Other: ____] : [unfilled] [de-identified] : Pt. was engaged in session; affect and mood WNL. This session pt. reports that he has begun looking into obtaining further training and education into his field of interest. Pt. 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 Pt. reports that obtaining this training will make him more marketable and improve his chances of earning a liveable wage. Furthermore, pt. reports that if it were "up to him" he would not seek this out, but feels obligated to in order to be a self sufficient adult. SW empathized with pt. and explored his desire to not do anything versus the forces motivating him to make changes in his life.  [Recommended Frequency of Visits: ____] : Recommended frequency of visits: [unfilled] [Return in ____ week(s)] : Return in [unfilled] week(s) [FreeTextEntry1] : Meet for weekly individual counseling, level of care is appropriate. Pt. has a safety plan in case of emergency.

## 2022-11-28 ENCOUNTER — APPOINTMENT (OUTPATIENT)
Dept: PSYCHIATRY | Facility: CLINIC | Age: 27
End: 2022-11-28

## 2022-11-28 PROCEDURE — 90837 PSYTX W PT 60 MINUTES: CPT

## 2022-11-29 NOTE — PLAN
[Recommended Frequency of Visits: ____] : Recommended frequency of visits: [unfilled] [Return in ____ week(s)] : Return in [unfilled] week(s) [FreeTextEntry2] : Increased understanding of anxious feelings\par Learn relaxation techniques to reduce anxiety (thought stopping, thought switching, creative visualization, progressive muscle relaxation, deep breathing)\par Learn about PTSD and healing childhood attachment injuries (IFS)\par  [Supportive Therapy] : Supportive Therapy [Other: ____] : [unfilled] [de-identified] : Pt. was engaged in session; affect and mood WNL. This session pt. and SW discussed and processed the part of him that considers suicide. Pt. spoke from this protector part of him that contemplates suicide, and the rational that this part of him provides. SW empathized with this part of pt. and utilized IFS to help pt. develop curiosity towards this part of him that carries the burdens from trauma. Pt. was receptive to IFS intervention and exercise.  [FreeTextEntry1] : Meet for weekly individual counseling, level of care is appropriate. Pt. has a safety plan in case of emergency.

## 2022-11-29 NOTE — END OF VISIT
[Individual Psychotherapy for 53+ minutes] : Individual Psychotherapy for 53+ minutes  [Duration of Psychotherapy Visit (minutes spent in synchronous communication): ____] : Duration of Psychotherapy Visit (minutes spent in synchronous communication): [unfilled]

## 2022-12-05 ENCOUNTER — APPOINTMENT (OUTPATIENT)
Dept: PSYCHIATRY | Facility: CLINIC | Age: 27
End: 2022-12-05

## 2022-12-05 PROCEDURE — 90837 PSYTX W PT 60 MINUTES: CPT

## 2022-12-06 NOTE — PLAN
[Recommended Frequency of Visits: ____] : Recommended frequency of visits: [unfilled] [Return in ____ week(s)] : Return in [unfilled] week(s) [FreeTextEntry2] : Increased understanding of anxious feelings\par Learn relaxation techniques to reduce anxiety (thought stopping, thought switching, creative visualization, progressive muscle relaxation, deep breathing)\par Learn about PTSD and healing childhood attachment injuries (IFS)\par  [Psychodynamic Therapy] : Psychodynamic Therapy  [Supportive Therapy] : Supportive Therapy [Other: ____] : [unfilled] [de-identified] : Pt. was engaged in session; affect and mood WNL. This session SW utilized IFS to help pt. work with a target part. Pt. identified his target part as a "suicide protector" that has kept him from taking responsibility for his decisions. Pt. reports that the part of him that considers suicide has, in its own way, helped him to cope with the burdens and trauma of his life experiences. Pt. expressed feeling at times hopeless and in despair, and the part of him that feels suicidal as protecting him from the overwhelming experience of these states. SW empathized with pt. and utilized IFS to help pt. focus on this part and what it wants him to know about his experiences.  [FreeTextEntry1] : Meet for weekly individual counseling, level of care is appropriate. Pt. has a safety plan in case of emergency.

## 2022-12-12 ENCOUNTER — APPOINTMENT (OUTPATIENT)
Dept: PSYCHIATRY | Facility: CLINIC | Age: 27
End: 2022-12-12

## 2022-12-12 PROCEDURE — 90837 PSYTX W PT 60 MINUTES: CPT

## 2022-12-14 NOTE — RISK ASSESSMENT
[Yes] : Yes [No] : No [Depressed mood/Anhedonia] : depressed mood/anhedonia [Hopelessness or despair] : hopelessness or despair [Triggering events leading to humiliation, shame, and/or despair] : triggering events leading to humiliation, shame, and/or despair (e.g. loss of relationship, financial or health status) (real or anticipated) [Supportive social network of family or friends] : supportive social network of family or friends [Positive therapeutic relationships] : positive therapeutic relationships [Frustration tolerance] : frustration tolerance [No known risk factors] : No known risk factors [No, patient denies ideation or behavior] : No, patient denies ideation or behavior [FreeTextEntry8] : Safety Plan reviewed

## 2022-12-14 NOTE — PLAN
[Recommended Frequency of Visits: ____] : Recommended frequency of visits: [unfilled] [Return in ____ week(s)] : Return in [unfilled] week(s) [FreeTextEntry2] : Increased understanding of anxious feelings\par Learn relaxation techniques to reduce anxiety (thought stopping, thought switching, creative visualization, progressive muscle relaxation, deep breathing)\par Learn about PTSD and healing childhood attachment injuries (IFS)\par  [Psychodynamic Therapy] : Psychodynamic Therapy  [Supportive Therapy] : Supportive Therapy [Other: ____] : [unfilled] [de-identified] : Pt. was engaged in session; affect flat and mood depressed. This session pt. and SW explored his suicidal part that suggests that life is not worth living, and that things will not improve. Pt. spoke from this protector part, and spent time with SW describing the details of this part of him, as if it were its own entity and personality. SW explored with pt. how he (his Self) feels towards this part, and if there can be a Self to part dialogue. Pt. reports he is able to differentiate himself from this part, and next session SW will facilitate patients Self to part dialogue. Pt. reports no SI/HI. Safety plan reviewed.  [FreeTextEntry1] : Meet for weekly individual counseling, level of care is appropriate. Pt. has a safety plan in case of emergency.

## 2022-12-19 ENCOUNTER — APPOINTMENT (OUTPATIENT)
Dept: PSYCHIATRY | Facility: CLINIC | Age: 27
End: 2022-12-19

## 2022-12-19 PROCEDURE — 90837 PSYTX W PT 60 MINUTES: CPT

## 2022-12-21 NOTE — PLAN
[Psychoeducation] : Psychoeducation  [Supportive Therapy] : Supportive Therapy [Other: ____] : [unfilled] [Recommended Frequency of Visits: ____] : Recommended frequency of visits: [unfilled] [Return in ____ week(s)] : Return in [unfilled] week(s) [FreeTextEntry2] : Increased understanding of anxious feelings\par Learn relaxation techniques to reduce anxiety (thought stopping, thought switching, creative visualization, progressive muscle relaxation, deep breathing)\par Learn about PTSD and healing childhood attachment injuries (IFS)\par  [de-identified] : Pt. was engaged in session; affect and mood WNL. This session pt. and SW discussed and explored the part of him that feels victimized. Pt. reports that he is hypervigilant towards people who can take advantage of him, and makes sure to protect himself when necessary. Pt. reports that historically he has not felt taken advantage of or victimized by people. However, he reports he remains on aware of its potential. SW empathized with pt. and utilized IFS to help pt. investigate further this part of him and its function.  [FreeTextEntry1] : Meet for weekly individual counseling, level of care is appropriate. Pt. has a safety plan in case of emergency.

## 2022-12-30 ENCOUNTER — APPOINTMENT (OUTPATIENT)
Dept: PSYCHIATRY | Facility: CLINIC | Age: 27
End: 2022-12-30
Payer: COMMERCIAL

## 2022-12-30 PROCEDURE — 90834 PSYTX W PT 45 MINUTES: CPT

## 2023-01-03 NOTE — PLAN
[Recommended Frequency of Visits: ____] : Recommended frequency of visits: [unfilled] [Return in ____ week(s)] : Return in [unfilled] week(s) [FreeTextEntry2] : Increased understanding of anxious feelings\par Learn relaxation techniques to reduce anxiety (thought stopping, thought switching, creative visualization, progressive muscle relaxation, deep breathing)\par Learn about PTSD and healing childhood attachment injuries (IFS)\par  [Psychodynamic Therapy] : Psychodynamic Therapy  [Supportive Therapy] : Supportive Therapy [Other: ____] : [unfilled] [de-identified] : Pt. was engaged in session; affect flat and mood depressed. This session pt. and SW discussed and explored the part of him that feels hopeless and not worthy. Pt. spoke about the "meaninglessness" of his life, and how he feels he is living his life to please others, rather than himself. Pt. reports that he does not believe anything would give his life meaning, and believes his opportunities have passed him by. SW empathized with pt. and utilized IFS and an existential approach to wrestle with the universality of his emotions and thoughts related to life and death, and finding meaning in one's life.  [FreeTextEntry1] : Meet for weekly individual counseling, level of care is appropriate. Pt. has a safety plan in case of emergency.

## 2023-01-09 ENCOUNTER — APPOINTMENT (OUTPATIENT)
Dept: PSYCHIATRY | Facility: CLINIC | Age: 28
End: 2023-01-09
Payer: COMMERCIAL

## 2023-01-09 PROCEDURE — 90837 PSYTX W PT 60 MINUTES: CPT

## 2023-01-10 NOTE — PLAN
[Recommended Frequency of Visits: ____] : Recommended frequency of visits: [unfilled] [Return in ____ week(s)] : Return in [unfilled] week(s) [FreeTextEntry2] : Increased understanding of anxious feelings\par Learn relaxation techniques to reduce anxiety (thought stopping, thought switching, creative visualization, progressive muscle relaxation, deep breathing)\par Learn about PTSD and healing childhood attachment injuries (IFS)\par  [Supportive Therapy] : Supportive Therapy [Other: ____] : [unfilled] [de-identified] : Pt. was engaged in session; affect flat and mood depressed. This session pt. and SW discussed and processed the part of him that feels suicidal and angry. Pt. spoke about how he has missed his opportunity to "succeed" and now feels there is no way to "change course." SW empathized with pt. and facilitate Self to part dialogue via IFS. Pt. was able to speak on behalf of his part and what it wanted him to know. Pt. denies plans on harming himself. Pt. was receptive to IFS.  [FreeTextEntry1] : Meet for weekly individual counseling, level of care is appropriate. Pt. has a safety plan in case of emergency.

## 2023-01-10 NOTE — RISK ASSESSMENT
[No, patient denies ideation or behavior] : No, patient denies ideation or behavior [Yes] : Yes [No] : No [Depressed mood/Anhedonia] : depressed mood/anhedonia [Hopelessness or despair] : hopelessness or despair [Triggering events leading to humiliation, shame, and/or despair] : triggering events leading to humiliation, shame, and/or despair (e.g. loss of relationship, financial or health status) (real or anticipated) [Supportive social network of family or friends] : supportive social network of family or friends [Positive therapeutic relationships] : positive therapeutic relationships [Frustration tolerance] : frustration tolerance [FreeTextEntry8] : Safety Plan reviewed

## 2023-01-23 ENCOUNTER — APPOINTMENT (OUTPATIENT)
Dept: PSYCHIATRY | Facility: CLINIC | Age: 28
End: 2023-01-23
Payer: COMMERCIAL

## 2023-01-23 PROCEDURE — 90837 PSYTX W PT 60 MINUTES: CPT

## 2023-01-24 NOTE — PLAN
[Recommended Frequency of Visits: ____] : Recommended frequency of visits: [unfilled] [Return in ____ week(s)] : Return in [unfilled] week(s) [FreeTextEntry2] : Increased understanding of anxious feelings\par Learn relaxation techniques to reduce anxiety (thought stopping, thought switching, creative visualization, progressive muscle relaxation, deep breathing)\par Learn about PTSD and healing childhood attachment injuries (IFS)\par  [Interpersonal Therapy] : Interpersonal Therapy  [Supportive Therapy] : Supportive Therapy [Other: ____] : [unfilled] [de-identified] : Pt. was engaged in session; affect flat and mood depressed. This session pt. and SW spent time discussing the part of him that feels hopeless, helpless and discouraged. Pt. spoke about not having motivation to care about his future self, as well as his present self and how he spends his time. Pt. reports that the time for his opportunities and to be "successful" has passed. SW empathized with pt. and explored the emotions of hopelessness and despair he experiences, and the passive suicidality that he thinks about. Pt. reports he has no plans on hurting himself or others, but is not going to try to take care of himself as well. Pt. does report he enrolled in a college course that will begin this week, and plans on attending this.  [FreeTextEntry1] : Meet for weekly individual counseling, level of care is appropriate. Pt. has a safety plan in case of emergency.

## 2023-01-30 ENCOUNTER — APPOINTMENT (OUTPATIENT)
Dept: PSYCHIATRY | Facility: CLINIC | Age: 28
End: 2023-01-30
Payer: COMMERCIAL

## 2023-01-30 PROCEDURE — 90837 PSYTX W PT 60 MINUTES: CPT

## 2023-01-30 NOTE — FAMILY HISTORY
[FreeTextEntry1] : Patient was born in [place]. Mother 52 is a cartographer. Father 55 is a . He has no brothers or sisters. His maternal cousin who had mood swings and her maternal grandmother who was bipolar. There is no other psychiatric alcohol or drug history in the family there is no abuse history growing up.

## 2023-01-30 NOTE — PAST MEDICAL HISTORY
[FreeTextEntry1] : At the beginning of college the patient went to the [employer] at [place]. Prior to that while at [employer] he felt isolative upset and kept to himself. Someone asked him what was going on he threatened to kill them. Because of that he was suspended. He had to see a therapist until he could go back to school. At [employer] he felt stressed out depressed and couldn't function. He felt overwhelmed and thought that everybody around him was better. The patient had some passive thoughts of self-harm wishing he would just disappear. Patient was started on Zoloft was titrated up to 200 mg. His primary care physician is now titrating it down and the patient is 100 mg.

## 2023-01-30 NOTE — HISTORY OF PRESENT ILLNESS
[de-identified] : Last month Lexapro 10 mg was increased to 15 mg. Patient does not feel it changed much. Since he was fired from his job on Sept 7, he has been actively looking but has not found a job currently. 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 \par \par Parents feel it is more positive. They feel he is more optimistic about his future. \par \par Sleep: 8-9 hours. No structure in the life.\par Appetite: good\par Energy, concentration and motivation are all decreased. Denies any AVH, SI or HI. Sees Stephane for therapy. \par

## 2023-01-30 NOTE — SOCIAL HISTORY
[FreeTextEntry1] : Patient grew up in [place]. He [facility] [employer]. He graduated in 2009. He was an honor student. He participated no activities or sports. He still has 3 friends from [employer] school that he sees on a regular basis. The patient then went to [employer] graduating in 2013. His grades were in the 90s. He participated no activities and had no friends. Patient currently is student at [employer] in [place]. He has a 3.2 grade point average in biochemistry. He has no friends. The patient plays video games. He currently has a purple belt and Chinese martial arts.

## 2023-01-31 NOTE — PLAN
[Recommended Frequency of Visits: ____] : Recommended frequency of visits: [unfilled] [Return in ____ week(s)] : Return in [unfilled] week(s) [FreeTextEntry2] : Increased understanding of anxious feelings\par Learn relaxation techniques to reduce anxiety (thought stopping, thought switching, creative visualization, progressive muscle relaxation, deep breathing)\par Learn about PTSD and healing childhood attachment injuries (IFS)\par  [Psychoeducation] : Psychoeducation  [Skills training (all types)] : Skills training (all types)  [Supportive Therapy] : Supportive Therapy [Other: ____] : [unfilled] [de-identified] : Pt. was engaged in session; affect flat and mood depressed. This session focused on listening to and understanding his suicide protector. Pt. reports he has a job interview tomorrow, and part of him feels "excited." However, pt. reports all other parts of him still feel "hopeless and apathetic." Pt. reports that the part that feels excited about working is cautiously optimistic about the job interview, and his suicide/hopeless protectors are making sure he does not become emotionally attached. SW empathized with pt. and asked pt. to befriend his protectors and to see if they can step aside while pt. is interviewing tomorrow to see what happens. Pt. reports they could step aside for a little to make space for him to see how the interview goes.  [FreeTextEntry1] : Meet for weekly individual counseling, level of care is appropriate. Pt. has a safety plan in case of emergency.

## 2023-02-06 ENCOUNTER — APPOINTMENT (OUTPATIENT)
Dept: PSYCHIATRY | Facility: CLINIC | Age: 28
End: 2023-02-06
Payer: COMMERCIAL

## 2023-02-06 PROCEDURE — 90837 PSYTX W PT 60 MINUTES: CPT

## 2023-02-07 NOTE — PLAN
[Recommended Frequency of Visits: ____] : Recommended frequency of visits: [unfilled] [Return in ____ week(s)] : Return in [unfilled] week(s) [FreeTextEntry2] : Increased understanding of anxious feelings\par Learn relaxation techniques to reduce anxiety (thought stopping, thought switching, creative visualization, progressive muscle relaxation, deep breathing)\par Learn about PTSD and healing childhood attachment injuries (IFS)\par  [Psychoeducation] : Psychoeducation  [Supportive Therapy] : Supportive Therapy [Other: ____] : [unfilled] [de-identified] : Pt. was engaged in session; affect and mood WNL. This session focused on patients ambivalence regarding taking care of himself. Pt. reports he had an interview last week that he believes went well. Pt. reports he will accept the job if he is offered it, and would like to work so he can earn money and so that his parents "leave him alone." Pt. reports another part of him feels "hopeless" that his life will improve in any meaningful way, and that he is delaying the inevitable. SW empathized with pt. and asked pt. to have these two parts of him interact with one another. Pt. was receptive to IFS exercise.  [FreeTextEntry1] : Meet for weekly individual counseling, level of care is appropriate. Pt. has a safety plan in case of emergency.

## 2023-02-07 NOTE — RISK ASSESSMENT
[No, patient denies ideation or behavior] : No, patient denies ideation or behavior [Depressed mood/Anhedonia] : depressed mood/anhedonia [Hopelessness or despair] : hopelessness or despair [Triggering events leading to humiliation, shame, and/or despair] : triggering events leading to humiliation, shame, and/or despair (e.g. loss of relationship, financial or health status) (real or anticipated) [Supportive social network of family or friends] : supportive social network of family or friends [Positive therapeutic relationships] : positive therapeutic relationships [Frustration tolerance] : frustration tolerance [FreeTextEntry8] : Safety Plan reviewed [FreeTextEntry9] : Pt. reports he will follow safety plan.  [Moderate acute suicide risk] : Moderate acute suicide risk [No] : No [Yes] : Safety Plan completed/updated (for individuals at risk): Yes

## 2023-02-13 ENCOUNTER — APPOINTMENT (OUTPATIENT)
Dept: PSYCHIATRY | Facility: CLINIC | Age: 28
End: 2023-02-13
Payer: COMMERCIAL

## 2023-02-13 PROCEDURE — 90837 PSYTX W PT 60 MINUTES: CPT

## 2023-02-14 NOTE — RISK ASSESSMENT
[No, patient denies ideation or behavior] : No, patient denies ideation or behavior [Depressed mood/Anhedonia] : depressed mood/anhedonia [Hopelessness or despair] : hopelessness or despair [Triggering events leading to humiliation, shame, and/or despair] : triggering events leading to humiliation, shame, and/or despair (e.g. loss of relationship, financial or health status) (real or anticipated) [Supportive social network of family or friends] : supportive social network of family or friends [Positive therapeutic relationships] : positive therapeutic relationships [Frustration tolerance] : frustration tolerance [Moderate acute suicide risk] : Moderate acute suicide risk [No] : No [Yes] : Safety Plan completed/updated (for individuals at risk): Yes [FreeTextEntry8] : Safety Plan reviewed [FreeTextEntry9] : Pt. reports he will follow safety plan.

## 2023-02-14 NOTE — PLAN
[Recommended Frequency of Visits: ____] : Recommended frequency of visits: [unfilled] [Return in ____ week(s)] : Return in [unfilled] week(s) [FreeTextEntry2] : Increased understanding of anxious feelings\par Learn relaxation techniques to reduce anxiety (thought stopping, thought switching, creative visualization, progressive muscle relaxation, deep breathing)\par Learn about PTSD and healing childhood attachment injuries (IFS)\par  [Psychodynamic Therapy] : Psychodynamic Therapy  [Skills training (all types)] : Skills training (all types)  [Supportive Therapy] : Supportive Therapy [Other: ____] : [unfilled] [de-identified] : Pt. was engaged in session; affect flat and mood depressed. This session pt. and SW discussed and explored his fears of "disappointing and hurting" people who care about him. Pt. reports that in the past he has "failed" which caused his parents emotional pain, and in turn reinforced his own shame and unworthiness that he carries. Now, pt. reports he does not try to take care of himself or "rehabilitate" himself in order to save the people he cares about from the pain of seeing someone they love suffer. LORNA empathized with pt. and processed his emotions of shame via IFS and behavior activation.  [FreeTextEntry1] : Meet for weekly individual counseling, level of care is appropriate. Pt. has a safety plan in case of emergency.

## 2023-02-27 ENCOUNTER — APPOINTMENT (OUTPATIENT)
Dept: PSYCHIATRY | Facility: CLINIC | Age: 28
End: 2023-02-27
Payer: COMMERCIAL

## 2023-02-27 PROCEDURE — 90837 PSYTX W PT 60 MINUTES: CPT

## 2023-02-27 PROCEDURE — 99214 OFFICE O/P EST MOD 30 MIN: CPT

## 2023-02-27 NOTE — HISTORY OF PRESENT ILLNESS
[de-identified] : \par Patient is here in the office for face to face interview with writer for 1 month follow-up visit.\par \par \par Last month Lexapro 15 was increased to 20 mg. States nothing is helping him in life. Feels the Lexapro is changing his personality and feels it is not doing anything fro him. Has no job or structure. Feels like death is a "best place because you do not heave to worry about anything or anyone." States he has no empathy towards anyone. States that that other day he heard on the news that a girl killed herself and he had no empathy for her and didn't feel bad she passed away because she is at a place where she does not need to be worried anymore or has no problems anymore. \par \par Patient states before he was depressed and prior to him coming to see someone for his mental health he wanted to be a doctor. "I wanted to help others. Now i don’t see myself do that because I have no motivation and have no sustained determination anymore." There are times where i don’t want to do anything except play video games because that distracts me from how i am feeling right now. \par \par He recalls a time in college at [employer] and he was taking this exam he thinks it was chemistry States he didn't know anythign for that exam and he just handed in his paper and left the room Went back to his dorm room and he started hysterically laughing because he thought he had determination and ambition to be a doctor to take a chemistry exam. \par \par His mother thinks he is doing well. Patient states he hides his feelings and his depression very well from them because "It would devastate my mother if she really know how i was feeling.\par \par Since he was fired from his job on Sept 7, he has been actively looking but has not found a job currently. 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 \par \par Sleep: 8-9 hours. No structure in the life.\par Appetite: good\par Energy, concentration and motivation are all decreased. Denies any AVH, SI or HI. Sees Stephane for therapy. \par

## 2023-02-27 NOTE — DISCUSSION/SUMMARY
[FreeTextEntry1] : Assessment: Patient is a 26-year-old male with h/o anxiety and depression seen today for medication management. Patient is compliant with his medications, tolerating them well without any side effects. I-STOP was checked without any issues. \par \par \par PLAN:\par Continue Wellbutrin 300 mg PO QAM for depression, low motivation, concentration energy, and SSRI induced sexual dysfunction \par Continue Lexapro 20 mg PO QD for depression and anxiety. \par - Discussed risks and benefits of medications including side effects of GI and sexual side effects with SSRI. Alternative strategies including no intervention discussed with patient. Patient consents to current medications as prescribed\par - Patient understands to contact clinic prn with concerns and agrees to call 911 or go to nearest ER if symptoms worsen.\par - Next appointment made in 1 month. Patient left the office without any distress.\par \par

## 2023-02-27 NOTE — FAMILY HISTORY
[FreeTextEntry1] : Patient was born in [place]. Mother 52 is a cartographer. Father 55 is a . He has no brothers or sisters. His maternal cousin who had mood swings and her maternal grandmother who was bipolar. There is no other psychiatric alcohol or drug history in the family there is no abuse history growing up.

## 2023-02-27 NOTE — SOCIAL HISTORY
[FreeTextEntry1] : Patient grew up in [place]. He attended [email or web address]. He graduated in 2009. He was an honor student. He participated no activities or sports. He still has 3 friends from [employer] school that he sees on a regular basis. The patient then went to [employer] graduating in 2013. His grades were in the 90s. He participated no activities and had no friends. Patient currently is student at [employer] in [place]. He has a 3.2 grade point average in biochemistry. He has no friends. The patient plays video games. He currently has a purple belt and Chinese martial arts.
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
## 2023-02-28 NOTE — RISK ASSESSMENT
[No, patient denies ideation or behavior] : No, patient denies ideation or behavior [Depressed mood/Anhedonia] : depressed mood/anhedonia [Hopelessness or despair] : hopelessness or despair [Triggering events leading to humiliation, shame, and/or despair] : triggering events leading to humiliation, shame, and/or despair (e.g. loss of relationship, financial or health status) (real or anticipated) [Supportive social network of family or friends] : supportive social network of family or friends [Positive therapeutic relationships] : positive therapeutic relationships [Frustration tolerance] : frustration tolerance [Moderate acute suicide risk] : Moderate acute suicide risk [No] : No [Yes] : Safety Plan completed/updated (for individuals at risk): Yes [Yes, patient reports ideation or behavior] : Yes, patient reports ideation or behavior [FreeTextEntry8] : Safety Plan reviewed [FreeTextEntry9] : Pt. reports he will follow safety plan.

## 2023-02-28 NOTE — PLAN
[Recommended Frequency of Visits: ____] : Recommended frequency of visits: [unfilled] [Return in ____ week(s)] : Return in [unfilled] week(s) [FreeTextEntry2] : Increased understanding of anxious feelings\par Learn relaxation techniques to reduce anxiety (thought stopping, thought switching, creative visualization, progressive muscle relaxation, deep breathing)\par Learn about PTSD and healing childhood attachment injuries (IFS)\par  [Psychoeducation] : Psychoeducation  [Supportive Therapy] : Supportive Therapy [Other: ____] : [unfilled] [de-identified] : Pt. was engaged in session; affect and mood WNL. This session pt. and SW paid attention to the parts of him that feel despair and detached from his environment. Pt. spoke about not having the desire or motivation to "better himself" and is exhausted at having to "pretend" like he cares about himself. Pt. reports that he cares about other people, which he feels has prevented him from hurting himself in the past. SW empathized with pt. and worked with him to bring curiosity and compassion to his parts that feel despair and shame. Pt. denies having plan to hurt himself. SW reviewed safety plan.  [FreeTextEntry1] : Meet for weekly individual counseling, level of care is appropriate. Pt. has a safety plan in case of emergency.

## 2023-03-06 ENCOUNTER — APPOINTMENT (OUTPATIENT)
Dept: PSYCHIATRY | Facility: CLINIC | Age: 28
End: 2023-03-06
Payer: COMMERCIAL

## 2023-03-06 PROCEDURE — 90837 PSYTX W PT 60 MINUTES: CPT

## 2023-03-07 NOTE — PLAN
[FreeTextEntry2] : Increased understanding of anxious feelings\par Learn relaxation techniques to reduce anxiety (thought stopping, thought switching, creative visualization, progressive muscle relaxation, deep breathing)\par Learn about PTSD and healing childhood attachment injuries (IFS)\par  [Interpersonal Therapy] : Interpersonal Therapy  [Psychodynamic Therapy] : Psychodynamic Therapy  [Supportive Therapy] : Supportive Therapy [Other: ____] : [unfilled] [de-identified] : Pt. was engaged in session; affect and mood WNL. This session pt. and SW discussed and explored his suicidal protector, and the dialogue he has with this part. Pt. reports that this part of him wants to protect himself from the pain of disappointing others, and so believes that suicide is the best option to keep him safe. Pt. reports it is inevitable that he will hurt and disappoint people, as he feels he has already done this with his parents. SW empathized with pt. and worked with him in getting to know more about his suicidal parts intent for him and its positive intention.  [Recommended Frequency of Visits: ____] : Recommended frequency of visits: [unfilled] [Return in ____ week(s)] : Return in [unfilled] week(s) [FreeTextEntry1] : Meet for weekly individual counseling, level of care is appropriate. Pt. has a safety plan in case of emergency.

## 2023-03-13 ENCOUNTER — APPOINTMENT (OUTPATIENT)
Dept: PSYCHIATRY | Facility: CLINIC | Age: 28
End: 2023-03-13
Payer: COMMERCIAL

## 2023-03-13 PROCEDURE — 90837 PSYTX W PT 60 MINUTES: CPT

## 2023-03-14 NOTE — PLAN
[Recommended Frequency of Visits: ____] : Recommended frequency of visits: [unfilled] [Return in ____ week(s)] : Return in [unfilled] week(s) [FreeTextEntry2] : Increased understanding of anxious feelings\par Learn relaxation techniques to reduce anxiety (thought stopping, thought switching, creative visualization, progressive muscle relaxation, deep breathing)\par Learn about PTSD and healing childhood attachment injuries (IFS)\par  [Interpersonal Therapy] : Interpersonal Therapy  [Psychodynamic Therapy] : Psychodynamic Therapy  [Supportive Therapy] : Supportive Therapy [Other: ____] : [unfilled] [de-identified] : Pt. was engaged in session; affect and mood WNL. This session pt. and SW discussed and explored his feelings of despair regarding the state of society. Pt. spoke about the "self destruction" that society seems to be on, and the "waste of time" it would be to take care of himself when the world around him is deteriorating. SW empathized with pt. and validated his experiences. SW utilized IFS to explore patients parts that carry despair and fear, and how it has helped him survive to this point in his life. Pt. was receptive to interventions.  [FreeTextEntry1] : Meet for weekly individual counseling, level of care is appropriate. Pt. has a safety plan in case of emergency.

## 2023-03-20 ENCOUNTER — APPOINTMENT (OUTPATIENT)
Dept: PSYCHIATRY | Facility: CLINIC | Age: 28
End: 2023-03-20
Payer: COMMERCIAL

## 2023-03-20 PROCEDURE — 90837 PSYTX W PT 60 MINUTES: CPT

## 2023-03-21 NOTE — PLAN
[Recommended Frequency of Visits: ____] : Recommended frequency of visits: [unfilled] [Return in ____ week(s)] : Return in [unfilled] week(s) [Psychodynamic Therapy] : Psychodynamic Therapy  [Supportive Therapy] : Supportive Therapy [Other: ____] : [unfilled] [FreeTextEntry2] : Increased understanding of anxious feelings\par Learn relaxation techniques to reduce anxiety (thought stopping, thought switching, creative visualization, progressive muscle relaxation, deep breathing)\par Learn about PTSD and healing childhood attachment injuries (IFS)\par  [de-identified] : Pt. was engaged in session; affect flat and mood depressed. This session pt. and SW discussed and explored his despair and hopelessness, and the suffering he lives with on a daily basis. Pt. expressed his desire to no longer live, and how he feels obligated to not hurt himself to protect his parents and people that know him. Pt. reports that he feels like a disappointment to his parents, and he lives with the shame and self loathing. SW empathized with pt. and validated his feelings of shame while exploring the function of his self loathing and how it has helped him survive.  [FreeTextEntry1] : Meet for weekly individual counseling, level of care is appropriate. Pt. has a safety plan in case of emergency.

## 2023-03-27 ENCOUNTER — APPOINTMENT (OUTPATIENT)
Dept: PSYCHIATRY | Facility: CLINIC | Age: 28
End: 2023-03-27
Payer: COMMERCIAL

## 2023-03-27 PROCEDURE — 99214 OFFICE O/P EST MOD 30 MIN: CPT

## 2023-03-27 PROCEDURE — 90837 PSYTX W PT 60 MINUTES: CPT

## 2023-03-27 NOTE — FAMILY HISTORY
[FreeTextEntry1] : Patient was born in [place]. Mother 52 is a cartographer. Father 55 is a . He has no brothers or sisters. His maternal cousin who had mood swings and her maternal grandmother who was bipolar. There is no other psychiatric alcohol or drug history in the family there is no abuse history growing up.

## 2023-03-27 NOTE — SOCIAL HISTORY
[FreeTextEntry1] : Patient grew up in [place]. He attended [employer]. He graduated in 2009. He was an honor student. He participated no activities or sports. He still has 3 friends from [employer] that he sees on a regular basis. The patient then went to [employer] school graduating in 2013. His grades were in the 90s. He participated no activities and had no friends. Patient currently is student at [employer] in [place]. He has a 3.2 grade point average in biochemistry. He has no friends. The patient plays video games. He currently has a purple belt and Chinese martial arts.

## 2023-03-27 NOTE — DISCUSSION/SUMMARY
[FreeTextEntry1] : Assessment: Patient is a 26-year-old male with h/o anxiety and depression seen today for medication management. Patient is compliant with his medications, tolerating them well without any side effects. I-STOP was checked without any issues. \par \par \par PLAN:\par Will refer patient out to ECT. \par Continue Wellbutrin 300 mg PO QAM for depression, low motivation, concentration energy, and SSRI induced sexual dysfunction \par Continue Lexapro 20 mg PO QD for depression and anxiety. \par - Discussed risks and benefits of medications including side effects of GI and sexual side effects with SSRI. Alternative strategies including no intervention discussed with patient. Patient consents to current medications as prescribed\par - Patient understands to contact clinic prn with concerns and agrees to call 911 or go to nearest ER if symptoms worsen.\par - Next appointment made in 1 month. Patient left the office without any distress.\par \par
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
## 2023-03-29 NOTE — PLAN
[Recommended Frequency of Visits: ____] : Recommended frequency of visits: [unfilled] [Return in ____ week(s)] : Return in [unfilled] week(s) [FreeTextEntry2] : Increased understanding of anxious feelings\par Learn relaxation techniques to reduce anxiety (thought stopping, thought switching, creative visualization, progressive muscle relaxation, deep breathing)\par Learn about PTSD and healing childhood attachment injuries (IFS)\par  [Supportive Therapy] : Supportive Therapy [Other: ____] : [unfilled] [FreeTextEntry1] : Meet for weekly individual counseling, level of care is appropriate. Pt. has a safety plan in case of emergency.  [de-identified] : Pt. was engaged in session; affect flat and mood depressed. This session pt. and SW discussed his recent appointment with Dr. Arrieta, and the possibility of receiving ECT.  Dr. Arrieta informed pt. of what this particular treatment does and how it works. Pt. reports he is open minded to the idea. SW informed pt. that he and Dr. Arrieta will let him know when someone will reach out to him to further explore if this treatment would be appropriate for him. Pt. spoke about his suicidal part, and how part of him wants to feel better, where another part is hopeless and cynical about him ever feeling differently than he presently does. SW empathized with pt. and worked with him in having his suicidal part express its fears and disappointments.

## 2023-04-03 ENCOUNTER — APPOINTMENT (OUTPATIENT)
Dept: PSYCHIATRY | Facility: CLINIC | Age: 28
End: 2023-04-03
Payer: COMMERCIAL

## 2023-04-03 PROCEDURE — 90837 PSYTX W PT 60 MINUTES: CPT

## 2023-04-08 NOTE — PLAN
[FreeTextEntry2] : Increased understanding of anxious feelings\par Learn relaxation techniques to reduce anxiety (thought stopping, thought switching, creative visualization, progressive muscle relaxation, deep breathing)\par Learn about PTSD and healing childhood attachment injuries (IFS)\par  [Psychodynamic Therapy] : Psychodynamic Therapy  [Supportive Therapy] : Supportive Therapy [Other: ____] : [unfilled] [de-identified] : Pt. was engaged in session; affect and mood WNL. This session pt. and SW discussed and explored the parts of him that feel shame and disappointment, and how this manifests. Pt. reports that he has been reflecting on his past, and the events that may have shaped the person he is today. SW empathized with pt. and explored how certain events in his past have made pt. more protective in order to not experience the rejection and shame he has experienced.  [Recommended Frequency of Visits: ____] : Recommended frequency of visits: [unfilled] [Return in ____ week(s)] : Return in [unfilled] week(s) [FreeTextEntry1] : Meet for weekly individual counseling, level of care is appropriate. Pt. has a safety plan in case of emergency.

## 2023-04-08 NOTE — RISK ASSESSMENT
[No, patient denies ideation or behavior] : No, patient denies ideation or behavior [Depressed mood/Anhedonia] : depressed mood/anhedonia [Hopelessness or despair] : hopelessness or despair [Triggering events leading to humiliation, shame, and/or despair] : triggering events leading to humiliation, shame, and/or despair (e.g. loss of relationship, financial or health status) (real or anticipated) [Supportive social network of family or friends] : supportive social network of family or friends [Positive therapeutic relationships] : positive therapeutic relationships [FreeTextEntry8] : Safety Plan reviewed [Frustration tolerance] : frustration tolerance [FreeTextEntry9] : Pt. reports he will follow safety plan.  [Moderate acute suicide risk] : Moderate acute suicide risk [No] : No [Yes] : Safety Plan completed/updated (for individuals at risk): Yes

## 2023-04-10 ENCOUNTER — APPOINTMENT (OUTPATIENT)
Dept: PSYCHIATRY | Facility: CLINIC | Age: 28
End: 2023-04-10
Payer: COMMERCIAL

## 2023-04-10 PROCEDURE — 90837 PSYTX W PT 60 MINUTES: CPT

## 2023-04-12 NOTE — PLAN
[Recommended Frequency of Visits: ____] : Recommended frequency of visits: [unfilled] [Return in ____ week(s)] : Return in [unfilled] week(s) [FreeTextEntry2] : Increased understanding of anxious feelings\par Learn relaxation techniques to reduce anxiety (thought stopping, thought switching, creative visualization, progressive muscle relaxation, deep breathing)\par Learn about PTSD and healing childhood attachment injuries (IFS)\par  [Skills training (all types)] : Skills training (all types)  [Supportive Therapy] : Supportive Therapy [Other: ____] : [unfilled] [de-identified] : Pt. was engaged in session; affect and mood WNL. This session pt. and SW discussed and explored the affect that social media has on his mental health. Pt. reports that he is trying to consume less news, as he realizes that he does not need to look far to know what is going on around the world. Pt. reports that he spends most of his time online playing video games and in chat rooms with people that he has common interests with. Pt. reports that people in these message boards share personal parts of themselves, including suicidal thoughts and thoughts of despair and hopelessness. Pt. reports he does not share this with others, and rather reads what other people are sharing. SW empathized with pt. and explored what this group provides him.  [FreeTextEntry1] : Meet for weekly individual counseling, level of care is appropriate. Pt. has a safety plan in case of emergency.

## 2023-04-17 ENCOUNTER — APPOINTMENT (OUTPATIENT)
Dept: PSYCHIATRY | Facility: CLINIC | Age: 28
End: 2023-04-17
Payer: COMMERCIAL

## 2023-04-17 PROCEDURE — 90837 PSYTX W PT 60 MINUTES: CPT

## 2023-04-18 NOTE — RISK ASSESSMENT
[Depressed mood/Anhedonia] : depressed mood/anhedonia [Hopelessness or despair] : hopelessness or despair [Triggering events leading to humiliation, shame, and/or despair] : triggering events leading to humiliation, shame, and/or despair (e.g. loss of relationship, financial or health status) (real or anticipated) [Supportive social network of family or friends] : supportive social network of family or friends [Positive therapeutic relationships] : positive therapeutic relationships [Frustration tolerance] : frustration tolerance [Moderate acute suicide risk] : Moderate acute suicide risk [No] : No [Yes] : Safety Plan completed/updated (for individuals at risk): Yes [No, patient denies ideation or behavior] : No, patient denies ideation or behavior [FreeTextEntry8] : Safety Plan reviewed [FreeTextEntry9] : Pt. reports he will follow safety plan.

## 2023-04-18 NOTE — PLAN
[Recommended Frequency of Visits: ____] : Recommended frequency of visits: [unfilled] [Return in ____ week(s)] : Return in [unfilled] week(s) [FreeTextEntry2] : Increased understanding of anxious feelings\par Learn relaxation techniques to reduce anxiety (thought stopping, thought switching, creative visualization, progressive muscle relaxation, deep breathing)\par Learn about PTSD and healing childhood attachment injuries (IFS)\par  [Supportive Therapy] : Supportive Therapy [Other: ____] : [unfilled] [de-identified] : Pt. was engaged in session; affect flat and mood depressed. This session pt. and SW grappled with the question of meaning, and what defines a meaningful life. Pt. reports that his life is meaningless, and is not interested in seeing if he can heal and recover from his trauma. Pt. expressed anger at having to pretend that he is excited about living, in order to not worry others, especially his parents. Pt. indicates that he spends his days applying for jobs and playing video games, and this is what provides him some kenia and relief in his life. SW empathized with pt. and worked with him in exploring the parts of him that are apathetic about his welfare and the context in which this is born from.  [FreeTextEntry1] : Meet for weekly individual counseling, level of care is appropriate. Pt. has a safety plan in case of emergency.

## 2023-04-24 ENCOUNTER — APPOINTMENT (OUTPATIENT)
Dept: PSYCHIATRY | Facility: CLINIC | Age: 28
End: 2023-04-24
Payer: COMMERCIAL

## 2023-04-24 PROCEDURE — 90837 PSYTX W PT 60 MINUTES: CPT

## 2023-04-24 PROCEDURE — 99214 OFFICE O/P EST MOD 30 MIN: CPT

## 2023-04-24 NOTE — SOCIAL HISTORY
[FreeTextEntry1] : Patient grew up in [place]. He [employer]. He graduated in 2009. He was an honor student. He participated no activities or sports. He still has 3 friends from [employer] school that he sees on a regular basis. The patient then went to [employer] graduating in 2013. His grades were in the 90s. He participated no activities and had no friends. Patient currently is student at [employer] in [place]. He has a 3.2 grade point average in biochemistry. He has no friends. The patient plays video games. He currently has a purple belt and Chinese martial arts.
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
## 2023-04-24 NOTE — FAMILY HISTORY
[FreeTextEntry1] : Patient was born in [place]. Mother 52 is a cartographer. Father 55 is a . He has no brothers or sisters. His maternal cousin who had mood swings and her maternal grandmother who was bipolar. There is no other psychiatric alcohol or drug history in the family there is no abuse history growing up.
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
## 2023-04-25 NOTE — PLAN
[Recommended Frequency of Visits: ____] : Recommended frequency of visits: [unfilled] [Return in ____ week(s)] : Return in [unfilled] week(s) [FreeTextEntry2] : Increased understanding of anxious feelings\par Learn relaxation techniques to reduce anxiety (thought stopping, thought switching, creative visualization, progressive muscle relaxation, deep breathing)\par Learn about PTSD and healing childhood attachment injuries (IFS)\par  [Cognitive and/or Behavior Therapy] : Cognitive and/or Behavior Therapy  [[facility]] : [facility]  [Psychodynamic Therapy] : Psychodynamic Therapy  [Supportive Therapy] : Supportive Therapy [Other: ____] : [unfilled] [de-identified] : Pt. was engaged in session; affect flat and mood depressed. This session pt. and SW discussed and explored the hopelessness and meaninglessness that he suffers from. Pt. spoke about the part of him that wants to stop trying to "be healthy and happy" and fully give in to his emotions of hopelessness and despair. Pt. reports that he is protecting his parents from his thoughts and feelings, and for feeling that they "failed as parents." SW empathized with pt. and explored some of his beliefs and fears via IFS and CBT.  [FreeTextEntry1] : Meet for weekly individual counseling, level of care is appropriate. Pt. has a safety plan in case of emergency.

## 2023-05-01 ENCOUNTER — APPOINTMENT (OUTPATIENT)
Dept: PSYCHIATRY | Facility: CLINIC | Age: 28
End: 2023-05-01
Payer: COMMERCIAL

## 2023-05-01 PROCEDURE — 90837 PSYTX W PT 60 MINUTES: CPT

## 2023-05-04 ENCOUNTER — OUTPATIENT (OUTPATIENT)
Dept: OUTPATIENT SERVICES | Facility: HOSPITAL | Age: 28
LOS: 1 days | Discharge: HOME | End: 2023-05-04
Payer: COMMERCIAL

## 2023-05-09 ENCOUNTER — APPOINTMENT (OUTPATIENT)
Dept: PSYCHIATRY | Facility: CLINIC | Age: 28
End: 2023-05-09
Payer: COMMERCIAL

## 2023-05-09 PROCEDURE — 90837 PSYTX W PT 60 MINUTES: CPT

## 2023-05-10 NOTE — PLAN
[Recommended Frequency of Visits: ____] : Recommended frequency of visits: [unfilled] [Return in ____ week(s)] : Return in [unfilled] week(s) [Cognitive and/or Behavior Therapy] : Cognitive and/or Behavior Therapy  [Supportive Therapy] : Supportive Therapy [Other: ____] : [unfilled] [FreeTextEntry2] : Increased understanding of anxious feelings\par Learn relaxation techniques to reduce anxiety (thought stopping, thought switching, creative visualization, progressive muscle relaxation, deep breathing)\par Learn about adverse childhood experiences and healing childhood attachment injuries (IFS)\par Engage in a productive activity \par Follow Safety Plan  [de-identified] : Pt. was engaged in session; affect flat and mood depressed. This session pt. shared his hopelessness, and the suffering he experiences as a result of feeling that there is no future for him. Pt. expressed a mixture of anger and shame at himself that contribute to his neglect for his own welfare, and not having the motivation to invest in his self care. Pt. denies his plan or intention on harming himself or others, and rather is waiting for his life to further get out of hand. Pt. also reports that he is in contact with the providers in the ECT treatment center to see if he can receive this intervention, and if he can afford it. LORNA empathized with pt. and worked with him in validating his anger and shame and exploring the protective function they have for his survival.  [FreeTextEntry1] : Meet for weekly individual counseling, level of care is appropriate. Pt. has a safety plan in case of emergency.

## 2023-05-15 ENCOUNTER — APPOINTMENT (OUTPATIENT)
Dept: PSYCHIATRY | Facility: CLINIC | Age: 28
End: 2023-05-15
Payer: COMMERCIAL

## 2023-05-15 PROCEDURE — 90837 PSYTX W PT 60 MINUTES: CPT

## 2023-05-16 ENCOUNTER — APPOINTMENT (OUTPATIENT)
Dept: PSYCHIATRY | Facility: CLINIC | Age: 28
End: 2023-05-16
Payer: COMMERCIAL

## 2023-05-16 ENCOUNTER — APPOINTMENT (OUTPATIENT)
Dept: PSYCHIATRY | Facility: CLINIC | Age: 28
End: 2023-05-16

## 2023-05-16 DIAGNOSIS — F60.9 PERSONALITY DISORDER, UNSPECIFIED: ICD-10-CM

## 2023-05-16 PROCEDURE — 90791 PSYCH DIAGNOSTIC EVALUATION: CPT | Mod: 95

## 2023-05-16 PROCEDURE — 99214 OFFICE O/P EST MOD 30 MIN: CPT

## 2023-05-16 NOTE — ECT CONSULT NOTE - DETAILS
Maternal grandmother with unspecified mental illness "I don't care if I " but denies wishing to be dead

## 2023-05-16 NOTE — HISTORY OF PRESENT ILLNESS
[de-identified] : \par Patient is here in the office for face to face interview with writer for 1 month follow-up visit.\par \par Patient's mother is accompanied with the patient. No medication changes at that time. States ECT clearance is done. Waiting to hear from the ECT team when the ECT start date will be. He feels hopeful about it but still feels depressed. \par \par Mood: depressed but feels hopeful about the ECT\par Sleep: 8-9 hours. No structure in the life.\par Appetite: good\par Energy, concentration and motivation are all decreased. Denies any AVH, SI or HI. Sees Stephane for therapy. \par States on his online class he got 100% on psychiatric diagnosis. ("Started laughing"). States he feels very grateful to have healthy parents, loving parents that love each other, and that help him when he needs help. Got a dog which is very therapeutic for him.

## 2023-05-16 NOTE — RISK ASSESSMENT
[No, patient denies ideation or behavior] : No, patient denies ideation or behavior [Depressed mood/Anhedonia] : depressed mood/anhedonia [Hopelessness or despair] : hopelessness or despair [Triggering events leading to humiliation, shame, and/or despair] : triggering events leading to humiliation, shame, and/or despair (e.g. loss of relationship, financial or health status) (real or anticipated) [Positive therapeutic relationships] : positive therapeutic relationships [Supportive social network of family or friends] : supportive social network of family or friends [Frustration tolerance] : frustration tolerance [Moderate acute suicide risk] : Moderate acute suicide risk [No] : No [Yes] : Safety Plan completed/updated (for individuals at risk): Yes [FreeTextEntry8] : Safety Plan reviewed [FreeTextEntry9] : Pt. reports he will follow safety plan.

## 2023-05-16 NOTE — PAST MEDICAL HISTORY
[FreeTextEntry1] : At the beginning of college the patient went to the [facility] [employer] at [place]. Prior to that while at [employer] he felt isolative upset and kept to himself. Someone asked him what was going on he threatened to kill them. Because of that he was suspended. He had to see a therapist until he could go back to school. At [employer] he felt stressed out depressed and couldn't function. He felt overwhelmed and thought that everybody around him was better. The patient had some passive thoughts of self-harm wishing he would just disappear. Patient was started on Zoloft was titrated up to 200 mg. His primary care physician is now titrating it down and the patient is 100 mg.

## 2023-05-16 NOTE — SOCIAL HISTORY
[FreeTextEntry1] : Patient grew up in [place]. He attended [employer]. He graduated in 2009. He was an honor student. He participated no activities or sports. He still has 3 friends from [employer] school that he sees on a regular basis. The patient then went to [employer] graduating in 2013. His grades were in the 90s. He participated no activities and had no friends. Patient currently is student at [employer] in [place]. He has a 3.2 grade point average in biochemistry. He has no friends. The patient plays video games. He currently has a purple belt and Chinese martial arts.

## 2023-05-16 NOTE — PLAN
[Recommended Frequency of Visits: ____] : Recommended frequency of visits: [unfilled] [Return in ____ week(s)] : Return in [unfilled] week(s) [FreeTextEntry2] : Increased understanding of anxious feelings\par Learn relaxation techniques to reduce anxiety (thought stopping, thought switching, creative visualization, progressive muscle relaxation, deep breathing)\par Learn about adverse childhood experiences and healing childhood attachment injuries (IFS)\par Engage in a productive activity \par Follow Safety Plan  [[facility]] : [facility]  [Supportive Therapy] : Supportive Therapy [Other: ____] : [unfilled] [de-identified] : Pt. was engaged in session; affect flat and mood depressed. This session pt. and SW discussed and explored his protector part of him that has experienced disappointment and rejection for most of his life. Pt. spoke about the parts of him that are suspicious and cynical, and how disgusted he is with the state of societal and cultural affairs. Pt. reports he feels unsafe interacting with others out of the potential for his actions or words being mischaracterized. Pt. indicates that it is in his best interest to have as minimal interactions with others. SW validated patient and explored the cynical part of him via IFS.  [FreeTextEntry1] : Meet for weekly individual counseling, level of care is appropriate. Pt. has a safety plan in case of emergency.

## 2023-05-16 NOTE — ECT CONSULT NOTE - NSECTMENTALSTATUSEXAM_PSY_ALL_CORE
The pt appeared casually and appropriately dressed, he was responsive and cooperative, but with very tangential and at times vague speech that needed frequent redirection. His facial expression was diminished and his mood was reported as anxious. His affect was flat. He denied delusions/paranoia/perceptual disturbances. No psychotic symptoms observed or elicited. He reported feeling that he doesn't care whether he lives or dies bur he has no active wishes of death and has no plans or intent to harm himself.   Insight/judgement: fair  MMSE: 30/30

## 2023-05-16 NOTE — ECT CONSULT NOTE - OTHER PAST PSYCHIATRIC HISTORY (INCLUDE DETAILS REGARDING ONSET, COURSE OF ILLNESS, INPATIENT/OUTPATIENT TREATMENT)
The interview was conducted via the [employer] platform with the pt in his residence and myself at the ECT consultation office at [facility]. The patient had consented in advance.  Mr Ramon Pollock is a 28yo male, single, currently unemployed residing with his parents.   He was referred by Dr. Arrieta for the treatment of depressive symptoms with passive SI that are chronic  and have not responded to medications and psychotherapy. Mr Pollock reports depressed mood, anxiety, anhedonia, lack of motivation and energy, as well as social avoidance and withdrawal. He has chronic thoughts that life is not worth living and he wouldn't mind if he , but has no plans or intent to harm himself.  He also reports somatic symptoms such as excessive sweating and frequent bowel movements that he attributes to his erratic eating and consumption of junk food. Do to his preoccupation with bowel movements and spending much time in the bathroom he was fired from his last job as a . He has been unemployed since 2022.   He reports that his sleep is good (9-10 hours) and so is his appetite. He reports that the last time that he felt well was when he graduated from high school 10 years ago. He currently rates his depression as 7/10 with 10 being the worse. He spends most of his time playing games on the internet.  He reports that he first started feeling anxious and depressed at age 12 when he started and felt that he did not belong "like a fish out of water". He started college at [employer] for [employer] but he was unable to concentrate and only completed a couple of classes. He transferred to a community college but still was not able to complete an associate's degree and dropped out 5 years. He described that time of his life as the bottom of his depression.  At that point he was prescribed Zoloft that helped minimally with his anxiety He took zoloft until  when Abilify was added with no improvement and his regimen was changed to Lexapro + Wellbutrin which offers some relief to the point that occasionally has the motivation to look for work, but without being able to pursue it.  Ms Pollock has no h/o of hospitalizations and no h/o od SA. He denies any ETOH/drug use and he does not smoke.  Mr Pollock was quite motivated to proceed with ECT because he feels that he has a very good chance to improve.

## 2023-05-16 NOTE — ECT CONSULT NOTE - NSECTASSESSRECOMM_PSY_ALL_CORE
Given the lack of response  to medications and psychotherapy and patient's preference, a course of ECT is a reasonable choice. However the chronicitty, the atypical nature of the pt's presentation as well as the lack of symptom-free premorbid functioning may present as a challenge to the evaluation of treatment response and full recovery. This was explicitly discussed with the pt who wishes to proceed with ECT as he feels that it will help him.    The patient encounter was from  10 to  11 am      More than 50% of the time was spent in counselling and/or coordination of care, including but not limited to discussing with patient and the risks and benefits of ECT, the usual trajectory of response with acute course of ECT, obtaining informed consent for ECT, reviewing ECT fasting guidelines, reviewing the need for periodic history and physical and labwork. Patient was asked to obtain labwork (CBC, BMP), EKG and medical clearance.

## 2023-05-16 NOTE — FAMILY HISTORY
[FreeTextEntry1] : Patient was born in [place]. Mother 52 is a cartographer. Father 55 is a . He has no brothers or sisters. His maternal cousin who had mood swings and her maternal grandmother who was bipolar. There is no other psychiatric alcohol or drug history in the family there is no abuse history growing up.

## 2023-05-17 ENCOUNTER — RESULT REVIEW (OUTPATIENT)
Age: 28
End: 2023-05-17

## 2023-05-17 ENCOUNTER — OUTPATIENT (OUTPATIENT)
Dept: OUTPATIENT SERVICES | Facility: HOSPITAL | Age: 28
LOS: 1 days | End: 2023-05-17
Payer: SUBSIDIZED

## 2023-05-17 ENCOUNTER — APPOINTMENT (OUTPATIENT)
Dept: MRI IMAGING | Facility: HOSPITAL | Age: 28
End: 2023-05-17

## 2023-05-17 DIAGNOSIS — Z00.6 ENCOUNTER FOR EXAMINATION FOR NORMAL COMPARISON AND CONTROL IN CLINICAL RESEARCH PROGRAM: ICD-10-CM

## 2023-05-17 DIAGNOSIS — Z00.00 ENCOUNTER FOR GENERAL ADULT MEDICAL EXAMINATION WITHOUT ABNORMAL FINDINGS: ICD-10-CM

## 2023-05-17 PROCEDURE — 70551 MRI BRAIN STEM W/O DYE: CPT | Mod: 26

## 2023-05-17 PROCEDURE — 70551 MRI BRAIN STEM W/O DYE: CPT

## 2023-05-22 ENCOUNTER — APPOINTMENT (OUTPATIENT)
Dept: PSYCHIATRY | Facility: CLINIC | Age: 28
End: 2023-05-22
Payer: COMMERCIAL

## 2023-05-22 PROCEDURE — 90837 PSYTX W PT 60 MINUTES: CPT

## 2023-05-23 ENCOUNTER — APPOINTMENT (OUTPATIENT)
Dept: PSYCHIATRY | Facility: CLINIC | Age: 28
End: 2023-05-23

## 2023-05-23 NOTE — PLAN
[Recommended Frequency of Visits: ____] : Recommended frequency of visits: [unfilled] [Return in ____ week(s)] : Return in [unfilled] week(s) [FreeTextEntry2] : Increased understanding of anxious feelings\par Learn relaxation techniques to reduce anxiety (thought stopping, thought switching, creative visualization, progressive muscle relaxation, deep breathing)\par Learn about adverse childhood experiences and healing childhood attachment injuries (IFS)\par Engage in a productive activity \par Follow Safety Plan  [Supportive Therapy] : Supportive Therapy [Other: ____] : [unfilled] [de-identified] : Pt. was engaged in session; affect flat and mood depressed. This session focused on the part of patient that feels "incompetent and unsuited" for interacting with others and working as an employee for someone. Pt. spoke about how different he feels from his fellow man, and not wanting and having the desire to be part of society. SW empathized with pt. and explored the part of him that has no desire or wants, and lives in a way in which his present basic needs are met. Pt. shared that he is not interested in understanding where his lack of motivation and self regard comes from, and feels there is no hope that his suffering will diminish. Pt. reports he is expecting to have his first ECT treatment shortly, and is looking forward to this.  [FreeTextEntry1] : Meet for weekly individual counseling, level of care is appropriate. Pt. will refer to safety plan.

## 2023-05-30 ENCOUNTER — APPOINTMENT (OUTPATIENT)
Dept: MRI IMAGING | Facility: HOSPITAL | Age: 28
End: 2023-05-30

## 2023-05-30 VITALS
HEART RATE: 90 BPM | RESPIRATION RATE: 17 BRPM | OXYGEN SATURATION: 97 % | SYSTOLIC BLOOD PRESSURE: 120 MMHG | DIASTOLIC BLOOD PRESSURE: 83 MMHG | TEMPERATURE: 98 F

## 2023-05-30 PROCEDURE — 90870 ELECTROCONVULSIVE THERAPY: CPT

## 2023-05-30 RX ORDER — MIDAZOLAM HYDROCHLORIDE 1 MG/ML
2 INJECTION, SOLUTION INTRAMUSCULAR; INTRAVENOUS ONCE
Refills: 0 | Status: DISCONTINUED | OUTPATIENT
Start: 2023-05-30 | End: 2023-06-20

## 2023-05-30 NOTE — ECT PRE-PROCEDURE CHECKLIST - NSECTCONSENT_PSY_ALL_CORE
ECT consent dated   Anesthesia consent  expires/yes BFA signed 5/30/23   Anesthesia signed 5/30/23/yes

## 2023-05-30 NOTE — ECT TREATMENT NOTE - NSECTCOMMENTS_PSY_ALL_CORE
Anxious before the first treatment but verbal and cooperative. He reports no changes in his mood since consultation

## 2023-05-31 ENCOUNTER — OUTPATIENT (OUTPATIENT)
Dept: OUTPATIENT SERVICES | Facility: HOSPITAL | Age: 28
LOS: 1 days | End: 2023-05-31
Payer: SUBSIDIZED

## 2023-05-31 ENCOUNTER — APPOINTMENT (OUTPATIENT)
Dept: MRI IMAGING | Facility: HOSPITAL | Age: 28
End: 2023-05-31

## 2023-05-31 ENCOUNTER — RESULT REVIEW (OUTPATIENT)
Age: 28
End: 2023-05-31

## 2023-05-31 DIAGNOSIS — Z00.00 ENCOUNTER FOR GENERAL ADULT MEDICAL EXAMINATION WITHOUT ABNORMAL FINDINGS: ICD-10-CM

## 2023-05-31 DIAGNOSIS — Z00.6 ENCOUNTER FOR EXAMINATION FOR NORMAL COMPARISON AND CONTROL IN CLINICAL RESEARCH PROGRAM: ICD-10-CM

## 2023-05-31 PROCEDURE — 70551 MRI BRAIN STEM W/O DYE: CPT | Mod: 26

## 2023-05-31 PROCEDURE — 70551 MRI BRAIN STEM W/O DYE: CPT

## 2023-06-01 PROCEDURE — 90870 ELECTROCONVULSIVE THERAPY: CPT

## 2023-06-01 NOTE — ECT TREATMENT NOTE - NSECTCOMMENTS_PSY_ALL_CORE
Some mild muscle soreness after 1st tx but no other adverse events.  Still depressed, no change in mood after 1st treatment.

## 2023-06-05 ENCOUNTER — APPOINTMENT (OUTPATIENT)
Dept: PSYCHIATRY | Facility: CLINIC | Age: 28
End: 2023-06-05
Payer: COMMERCIAL

## 2023-06-05 PROCEDURE — 90837 PSYTX W PT 60 MINUTES: CPT

## 2023-06-06 PROCEDURE — 90870 ELECTROCONVULSIVE THERAPY: CPT

## 2023-06-06 RX ORDER — MIDAZOLAM HYDROCHLORIDE 1 MG/ML
2 INJECTION, SOLUTION INTRAMUSCULAR; INTRAVENOUS ONCE
Refills: 0 | Status: DISCONTINUED | OUTPATIENT
Start: 2023-06-06 | End: 2023-06-06

## 2023-06-06 NOTE — ECT TREATMENT NOTE - NSECTCOMMENTS_PSY_ALL_CORE
Pt states depression is still "not good".  States over weekend he was active.  Glad he got A in psychology course but jokes with MD he will not be pursuing a degree in psychology.  When asked what sx of depression he has he says "well maybe having SI, not active but I think about it" is primary target symptom.   Pt agree able to continue acute ECT for chronic suicidality.    70Hz used.

## 2023-06-07 ENCOUNTER — APPOINTMENT (OUTPATIENT)
Dept: MRI IMAGING | Facility: HOSPITAL | Age: 28
End: 2023-06-07

## 2023-06-07 ENCOUNTER — RESULT REVIEW (OUTPATIENT)
Age: 28
End: 2023-06-07

## 2023-06-07 ENCOUNTER — OUTPATIENT (OUTPATIENT)
Dept: OUTPATIENT SERVICES | Facility: HOSPITAL | Age: 28
LOS: 1 days | End: 2023-06-07
Payer: SUBSIDIZED

## 2023-06-07 DIAGNOSIS — F60.9 PERSONALITY DISORDER, UNSPECIFIED: ICD-10-CM

## 2023-06-07 DIAGNOSIS — Z00.00 ENCOUNTER FOR GENERAL ADULT MEDICAL EXAMINATION WITHOUT ABNORMAL FINDINGS: ICD-10-CM

## 2023-06-07 DIAGNOSIS — F32.1 MAJOR DEPRESSIVE DISORDER, SINGLE EPISODE, MODERATE: ICD-10-CM

## 2023-06-07 DIAGNOSIS — Z00.6 ENCOUNTER FOR EXAMINATION FOR NORMAL COMPARISON AND CONTROL IN CLINICAL RESEARCH PROGRAM: ICD-10-CM

## 2023-06-07 PROCEDURE — 70551 MRI BRAIN STEM W/O DYE: CPT | Mod: 26

## 2023-06-07 PROCEDURE — 70551 MRI BRAIN STEM W/O DYE: CPT

## 2023-06-07 NOTE — PLAN
[Recommended Frequency of Visits: ____] : Recommended frequency of visits: [unfilled] [Return in ____ week(s)] : Return in [unfilled] week(s) [Cognitive and/or Behavior Therapy] : Cognitive and/or Behavior Therapy  [Psychoeducation] : Psychoeducation  [Supportive Therapy] : Supportive Therapy [FreeTextEntry2] : Increased understanding of anxious feelings\par Learn relaxation techniques to reduce anxiety (thought stopping, thought switching, creative visualization, progressive muscle relaxation, deep breathing)\par Learn about adverse childhood experiences and healing childhood attachment injuries (IFS)\par Engage in a productive activity \par Follow Safety Plan  [de-identified] : Pt. was engaged in session; affect and mood WNL. This session pt. spoke about his two ECT treatments that he has had thus far. Pt. reports he has noticed a "slight improvement" in regards to his mood since these two treatments. Pt. expressed that he is open minded that this treatment will have some positive consequences for the way he thinks, feels or behaves. Pt. and SW also discussed his academic path and im passing his psychology course with an A, and taking two more courses this summer. SW empathized with pt. and validated his hard work and commitment to his studies. SW worked with pt. in creating a routine.  [FreeTextEntry1] : Meet for weekly individual counseling, level of care is appropriate. Pt. will refer to safety plan.

## 2023-06-08 PROCEDURE — 90870 ELECTROCONVULSIVE THERAPY: CPT

## 2023-06-08 NOTE — ECT TREATMENT NOTE - NSECTCOMMENTS_PSY_ALL_CORE
Pt states he feels maybe a little better, no SI since last visit.  Gives current symptom as "not wanting to feel like he isn't worthy of the space he occupies".   Will continue acute ECT.    Monitor seizure durations.  If long at next visit, consider changing induction agent.      70Hz used.

## 2023-06-12 ENCOUNTER — APPOINTMENT (OUTPATIENT)
Dept: PSYCHIATRY | Facility: CLINIC | Age: 28
End: 2023-06-12

## 2023-06-12 PROCEDURE — 90870 ELECTROCONVULSIVE THERAPY: CPT

## 2023-06-12 RX ORDER — MIDAZOLAM HYDROCHLORIDE 1 MG/ML
2 INJECTION, SOLUTION INTRAMUSCULAR; INTRAVENOUS ONCE
Refills: 0 | Status: DISCONTINUED | OUTPATIENT
Start: 2023-06-12 | End: 2023-06-12

## 2023-06-12 RX ADMIN — MIDAZOLAM HYDROCHLORIDE 2 MILLIGRAM(S): 1 INJECTION, SOLUTION INTRAMUSCULAR; INTRAVENOUS at 16:01

## 2023-06-12 NOTE — ECT TREATMENT NOTE - NSECTCOMMENTS_PSY_ALL_CORE
Patient reports he thinks he is doing better, but does not want to admit it since he does not know how to evaluate that he is better.   Appetite - low; sleep - disturbance in sleeping pattern but attributes that to ongoing ECT sessions. Denies suicidal thoughts.   Will continue with acute course.

## 2023-06-14 ENCOUNTER — APPOINTMENT (OUTPATIENT)
Dept: MRI IMAGING | Facility: HOSPITAL | Age: 28
End: 2023-06-14

## 2023-06-14 ENCOUNTER — OUTPATIENT (OUTPATIENT)
Dept: OUTPATIENT SERVICES | Facility: HOSPITAL | Age: 28
LOS: 1 days | End: 2023-06-14
Payer: SUBSIDIZED

## 2023-06-14 ENCOUNTER — RESULT REVIEW (OUTPATIENT)
Age: 28
End: 2023-06-14

## 2023-06-14 DIAGNOSIS — Z00.6 ENCOUNTER FOR EXAMINATION FOR NORMAL COMPARISON AND CONTROL IN CLINICAL RESEARCH PROGRAM: ICD-10-CM

## 2023-06-14 DIAGNOSIS — Z00.00 ENCOUNTER FOR GENERAL ADULT MEDICAL EXAMINATION WITHOUT ABNORMAL FINDINGS: ICD-10-CM

## 2023-06-14 PROCEDURE — 70551 MRI BRAIN STEM W/O DYE: CPT

## 2023-06-14 PROCEDURE — 70551 MRI BRAIN STEM W/O DYE: CPT | Mod: 26

## 2023-06-15 PROCEDURE — 90870 ELECTROCONVULSIVE THERAPY: CPT

## 2023-06-15 RX ORDER — MIDAZOLAM HYDROCHLORIDE 1 MG/ML
2 INJECTION, SOLUTION INTRAMUSCULAR; INTRAVENOUS ONCE
Refills: 0 | Status: DISCONTINUED | OUTPATIENT
Start: 2023-06-15 | End: 2023-06-15

## 2023-06-15 RX ADMIN — MIDAZOLAM HYDROCHLORIDE 2 MILLIGRAM(S): 1 INJECTION, SOLUTION INTRAMUSCULAR; INTRAVENOUS at 17:36

## 2023-06-15 NOTE — ECT TREATMENT NOTE - NSECTCOMMENTS_PSY_ALL_CORE
70 Hz used    Pt says he thinks he's better but more because he's optimistic about getting better and hasn't noticed any symptom change otherwise.   IF no improvement at next visit, consider inc energy.

## 2023-06-17 NOTE — DISCUSSION/SUMMARY
Likely secondary to hepatic cirrhosis and development of splenomegaly  Has worsened and associated with hgb drop, transfuse to >50K  No signs of bleeding in GI tract or on CT, hemolysis on labs  Hematology consulted, tested negative for HIT    6/11 --> acute hypotension, hypoxia --> hypovolemic shock --> Large R peritoneal hematoma --> IR for class A embo --> s/p IR embolization of left lumbar and internal iliac branch foci of arterial extravasation    - Plts continue to trending down despite transfusion, however, no active bleeding. Will transfuse if plt < 25, or < 50 with active bleeding   [FreeTextEntry1] : Assessment: Patient is a 26-year-old male with h/o anxiety and depression seen today for medication management. Patient is compliant with his medications, tolerating them well without any side effects. I-STOP was checked without any issues. \par \par \par PLAN:\par Continue Wellbutrin 300 mg PO QAM for depression, low motivation, concentration energy, and SSRI induced sexual dysfunction \par Increase Lexapro 10 to 15 mg PO QD for depression and anxiety. \par - Discussed risks and benefits of medications including side effects of GI and sexual side effects with SSRI. Alternative strategies including no intervention discussed with patient. Patient consents to current medications as prescribed\par - Patient understands to contact clinic prn with concerns and agrees to call 911 or go to nearest ER if symptoms worsen.\par - Next appointment made in 2 month. Patient left the office without any distress.\par \par

## 2023-06-19 ENCOUNTER — APPOINTMENT (OUTPATIENT)
Dept: PSYCHIATRY | Facility: CLINIC | Age: 28
End: 2023-06-19
Payer: COMMERCIAL

## 2023-06-19 PROCEDURE — 90837 PSYTX W PT 60 MINUTES: CPT

## 2023-06-20 PROCEDURE — 90870 ELECTROCONVULSIVE THERAPY: CPT

## 2023-06-20 RX ORDER — MIDAZOLAM HYDROCHLORIDE 1 MG/ML
2 INJECTION, SOLUTION INTRAMUSCULAR; INTRAVENOUS ONCE
Refills: 0 | Status: DISCONTINUED | OUTPATIENT
Start: 2023-06-20 | End: 2023-06-20

## 2023-06-20 RX ADMIN — MIDAZOLAM HYDROCHLORIDE 2 MILLIGRAM(S): 1 INJECTION, SOLUTION INTRAMUSCULAR; INTRAVENOUS at 13:40

## 2023-06-20 NOTE — PLAN
[FreeTextEntry2] : Increased understanding of anxious feelings\par Learn relaxation techniques to reduce anxiety (thought stopping, thought switching, creative visualization, progressive muscle relaxation, deep breathing)\par Learn about adverse childhood experiences and healing childhood attachment injuries (IFS)\par Engage in a productive activity \par Follow Safety Plan  [Cognitive and/or Behavior Therapy] : Cognitive and/or Behavior Therapy  [Supportive Therapy] : Supportive Therapy [Other: ____] : [unfilled] [de-identified] : Pt. was engaged in session; affect and mood WNL. This session pt. and SW explored how he has been experiencing his ECT treatment thus far. Pt. reports he is still unsure if it has been having any positive therapeutic affects for him. However, he reports not thinking about suicide as much as he used to. Pt. reports he has been going twice a week for treatment, and feels tired afterwards. Pt. reports he has been spending most of his time at home playing video games, and is content with this routine right now. SW explored with pt. if he can implement one new activity in his routine that he can experiment with that can potentially improve his self esteem and physical or mental health.  [Recommended Frequency of Visits: ____] : Recommended frequency of visits: [unfilled] [Return in ____ week(s)] : Return in [unfilled] week(s) [FreeTextEntry1] : Meet for weekly individual counseling, level of care is appropriate. Pt. will refer to safety plan.

## 2023-06-20 NOTE — ECT TREATMENT NOTE - NSECTCOMMENTS_PSY_ALL_CORE
Patient reported no improvement and stated that his energy levels are actually lower than prior to starting ECT. He did enjoy Father's Day, however. Otherwise he was laconic and alexithymic. Energy increased in anticipation of a dose-response. We will continue the acute course.

## 2023-06-21 ENCOUNTER — OUTPATIENT (OUTPATIENT)
Dept: OUTPATIENT SERVICES | Facility: HOSPITAL | Age: 28
LOS: 1 days | End: 2023-06-21
Payer: SUBSIDIZED

## 2023-06-21 ENCOUNTER — APPOINTMENT (OUTPATIENT)
Dept: MRI IMAGING | Facility: HOSPITAL | Age: 28
End: 2023-06-21

## 2023-06-21 ENCOUNTER — RESULT REVIEW (OUTPATIENT)
Age: 28
End: 2023-06-21

## 2023-06-21 DIAGNOSIS — Z00.6 ENCOUNTER FOR EXAMINATION FOR NORMAL COMPARISON AND CONTROL IN CLINICAL RESEARCH PROGRAM: ICD-10-CM

## 2023-06-21 DIAGNOSIS — Z00.00 ENCOUNTER FOR GENERAL ADULT MEDICAL EXAMINATION WITHOUT ABNORMAL FINDINGS: ICD-10-CM

## 2023-06-21 PROCEDURE — 70551 MRI BRAIN STEM W/O DYE: CPT | Mod: 26

## 2023-06-21 PROCEDURE — 70551 MRI BRAIN STEM W/O DYE: CPT

## 2023-06-23 PROCEDURE — 90870 ELECTROCONVULSIVE THERAPY: CPT

## 2023-06-23 RX ORDER — MIDAZOLAM HYDROCHLORIDE 1 MG/ML
2 INJECTION, SOLUTION INTRAMUSCULAR; INTRAVENOUS ONCE
Refills: 0 | Status: DISCONTINUED | OUTPATIENT
Start: 2023-06-23 | End: 2023-06-23

## 2023-06-23 RX ADMIN — MIDAZOLAM HYDROCHLORIDE 2 MILLIGRAM(S): 1 INJECTION, SOLUTION INTRAMUSCULAR; INTRAVENOUS at 14:06

## 2023-06-23 NOTE — ECT TREATMENT NOTE - NSECTCOMMENTS_PSY_ALL_CORE
Mr. Pollock reports that he feels that he is doing better and ECT is helping. He has enrolled for Piano lessons and "for the first time I am looking forward to it instead of dreading it ".  His sleep is good, appetite: unchanged. Denies any somatic concerns. Reports that in past he used to experience death wishes, but has not experienced it in last few weeks. Denies active suicidal ideation/intent/plan.  He rates his depression as 4/10 with 10 being the worst seizure. He describes it as "moderate".      He is scheduled to see Dr. Arrieta on Monday. Recommend continuing twice a week ECT as long as there is incremental improvement. He should also start exploring his hobbies/pursuing his interests.  Mr. Pollock reports that he feels that he is doing better and ECT is helping. He has enrolled for Piano lessons and "for the first time I am looking forward to it instead of dreading it ".  His sleep is good, appetite: unchanged. Denies any somatic concerns. Reports that in past he used to experience death wishes, but has not experienced it in last few weeks. Denies active suicidal ideation/intent/plan.  He rates his depression as 4/10 with 10 being the worst depression. He describes it as "moderate".      He is scheduled to see Dr. Arrieta on Monday. Recommend continuing twice a week ECT as long as there is incremental improvement. He should also start exploring his hobbies/pursuing his interests.

## 2023-06-26 ENCOUNTER — APPOINTMENT (OUTPATIENT)
Dept: PSYCHIATRY | Facility: CLINIC | Age: 28
End: 2023-06-26
Payer: COMMERCIAL

## 2023-06-26 PROCEDURE — 90837 PSYTX W PT 60 MINUTES: CPT

## 2023-06-26 PROCEDURE — 99214 OFFICE O/P EST MOD 30 MIN: CPT

## 2023-06-26 NOTE — PAST MEDICAL HISTORY
[FreeTextEntry1] : At the beginning of college the patient went to the [employer] at [place]. Prior to that while at [employer] he felt isolative upset and kept to himself. Someone asked him what was going on he threatened to kill them. Because of that he was suspended. He had to see a therapist until he could go back to school. At [place] he felt stressed out depressed and couldn't function. He felt overwhelmed and thought that everybody around him was better. The patient had some passive thoughts of self-harm wishing he would just disappear. Patient was started on Zoloft was titrated up to 200 mg. His primary care physician is now titrating it down and the patient is 100 mg.

## 2023-06-26 NOTE — SOCIAL HISTORY
[FreeTextEntry1] : Patient grew up in [place]. He attended [employer]. He graduated in 2009. He was an honor student. He participated no activities or sports. He still has 3 friends from [employer] school that he sees on a regular basis. The patient then went to [employer] graduating in 2013. His grades were in the 90s. He participated no activities and had no friends. Patient currently is student at [employer] in [place]. He has a 3.2 grade point average in biochemistry. He has no friends. The patient plays video games. He currently has a purple belt and Chinese martial arts.

## 2023-06-26 NOTE — FAMILY HISTORY
[FreeTextEntry1] : Patient was born in [place]. Mother 52 is a cartographer. Father 55 is a . He has no brothers or sisters. His maternal cousin who had mood swings and her maternal grandmother who was bipolar. There is no other psychiatric alcohol or drug history in the family there is no abuse history growing up.

## 2023-06-26 NOTE — DISCUSSION/SUMMARY
[FreeTextEntry1] : Assessment: Patient is a 26-year-old male with h/o anxiety and depression seen today for medication management. Patient is compliant with his medications, tolerating them well without any side effects. I-STOP was checked without any issues. \par \par \par PLAN:\par Continue with ECT biweekly\par Continue Wellbutrin 300 mg PO QAM for depression, low motivation, concentration energy, and SSRI induced sexual dysfunction \par Continue Lexapro 20 mg PO QD for depression and anxiety. \par - Discussed risks and benefits of medications including side effects of GI and sexual side effects with SSRI. Alternative strategies including no intervention discussed with patient. Patient consents to current medications as prescribed\par - Patient understands to contact clinic prn with concerns and agrees to call 911 or go to nearest ER if symptoms worsen.\par - Next appointment made in 1 month. Patient left the office without any distress.\par \par

## 2023-06-26 NOTE — HISTORY OF PRESENT ILLNESS
[de-identified] : \par Patient is here in the office for face to face interview with writer for 1 month follow-up visit.\par \par Patient states he has had 8 sessions so far with ECT. States it is exhausting, but states he is feeling different and "better" with it than without it. Does ECT biweekly Tues and Thursday. \par States he is doing online classes fro piano and registered for weight training classes. \par \par Mood: foggy always thinking he is going to forget something. Less depression. Hopeful. \par Sleep: 10pm-9am. When asked this question there was a 2-3 min pause. States "I am organizing my thoughts." \par Appetite: good\par Energy, concentration and motivation are all decreased. Denies any AVH, SI or HI. Sees Stephane for therapy. \par Got a dog which is very therapeutic for him.  \par \par As per Dr. Bliss:\par "At this point, he should start exploring other therapeutic options/services that might help him. We do get a sense that he might be on spectrum.\par Recommend continuing twice a week ECT as long as there is incremental improvement. He should also start exploring his hobbies/pursuing his interests.”\par \par

## 2023-06-27 PROCEDURE — 90870 ELECTROCONVULSIVE THERAPY: CPT

## 2023-06-27 RX ORDER — MIDAZOLAM HYDROCHLORIDE 1 MG/ML
2 INJECTION, SOLUTION INTRAMUSCULAR; INTRAVENOUS ONCE
Refills: 0 | Status: DISCONTINUED | OUTPATIENT
Start: 2023-06-27 | End: 2023-06-27

## 2023-06-27 RX ADMIN — MIDAZOLAM HYDROCHLORIDE 2 MILLIGRAM(S): 1 INJECTION, SOLUTION INTRAMUSCULAR; INTRAVENOUS at 13:24

## 2023-06-27 NOTE — PLAN
[Recommended Frequency of Visits: ____] : Recommended frequency of visits: [unfilled] [Return in ____ week(s)] : Return in [unfilled] week(s) [Psychoeducation] : Psychoeducation  [Supportive Therapy] : Supportive Therapy [FreeTextEntry2] : Increased understanding of anxious feelings\par Learn relaxation techniques to reduce anxiety (thought stopping, thought switching, creative visualization, progressive muscle relaxation, deep breathing)\par Learn about adverse childhood experiences and healing childhood attachment injuries (IFS)\par Engage in a productive activity \par Follow Safety Plan  [Other: ____] : [unfilled] [de-identified] : Pt. was engaged in session; affect and mood WNL. This session pt. and SW discussed and explored the subtle changes he has observed in his self talk. Pt. reports that ECT has been going well thus far, and has noticed that his suicidal ideations and hopelessness has dissipated. Pt. reports that over the past several weeks he has not thought about suicide and the different thoughts and images that come with this protector part. Pt. reports his routine has been more or less the same since beginning ECT, and does not believe there have been any other variables that are contributing to his mood changes. SW empathized with pt. and explored what it is like to not have his suicidal protector running the show. Pt. was receptive to IFS. [FreeTextEntry1] : Meet for weekly individual counseling, level of care is appropriate. Pt. will refer to safety plan.

## 2023-06-27 NOTE — ECT TREATMENT NOTE - NSECTCOMMENTS_PSY_ALL_CORE
Continues to report improvement in mood.  States he "isn't dreading upcoming classes" but then can't think of any other symptoms.  Does say he thinks about suicide "much less" than pre-ECT.  Did see Dr. Arrieta yesterday and when asked outcome states back comments/rec from Dr. Bliss that he should continue acute as long as he thinks he is feeling better.   Denies cognitive side effects.   Will continue acute for now until pt reaches plateau or #12 as clinically indicated.

## 2023-06-28 ENCOUNTER — APPOINTMENT (OUTPATIENT)
Dept: MRI IMAGING | Facility: HOSPITAL | Age: 28
End: 2023-06-28

## 2023-06-28 ENCOUNTER — OUTPATIENT (OUTPATIENT)
Dept: OUTPATIENT SERVICES | Facility: HOSPITAL | Age: 28
LOS: 1 days | End: 2023-06-28
Payer: SUBSIDIZED

## 2023-06-28 ENCOUNTER — RESULT REVIEW (OUTPATIENT)
Age: 28
End: 2023-06-28

## 2023-06-28 DIAGNOSIS — Z00.6 ENCOUNTER FOR EXAMINATION FOR NORMAL COMPARISON AND CONTROL IN CLINICAL RESEARCH PROGRAM: ICD-10-CM

## 2023-06-28 DIAGNOSIS — Z00.00 ENCOUNTER FOR GENERAL ADULT MEDICAL EXAMINATION WITHOUT ABNORMAL FINDINGS: ICD-10-CM

## 2023-06-28 PROCEDURE — 70551 MRI BRAIN STEM W/O DYE: CPT | Mod: 26

## 2023-06-28 PROCEDURE — 70551 MRI BRAIN STEM W/O DYE: CPT

## 2023-06-30 PROCEDURE — 90870 ELECTROCONVULSIVE THERAPY: CPT

## 2023-06-30 RX ORDER — MIDAZOLAM HYDROCHLORIDE 1 MG/ML
2 INJECTION, SOLUTION INTRAMUSCULAR; INTRAVENOUS ONCE
Refills: 0 | Status: DISCONTINUED | OUTPATIENT
Start: 2023-06-30 | End: 2023-06-30

## 2023-06-30 RX ADMIN — MIDAZOLAM HYDROCHLORIDE 2 MILLIGRAM(S): 1 INJECTION, SOLUTION INTRAMUSCULAR; INTRAVENOUS at 14:00

## 2023-06-30 NOTE — ECT TREATMENT NOTE - NSECTCOMMENTS_PSY_ALL_CORE
Pt reports he felt well during the interval until yesterday when he experienced the abrupt onset of passive SI w/ the thought "I don't want to be here." this occurred in response to bad news he received about his insurance. pt denied active SI/plan/intent, acts of furtherance, rehearsal behaviors, preparatory actions, and denied current SI. otherwise had difficulty pointing to specific residual sx of depression. pt denied AE from ECT and offered no further complaints.

## 2023-07-03 PROCEDURE — 90870 ELECTROCONVULSIVE THERAPY: CPT

## 2023-07-03 RX ORDER — MIDAZOLAM HYDROCHLORIDE 1 MG/ML
2 INJECTION, SOLUTION INTRAMUSCULAR; INTRAVENOUS ONCE
Refills: 0 | Status: DISCONTINUED | OUTPATIENT
Start: 2023-07-03 | End: 2023-07-03

## 2023-07-03 RX ADMIN — MIDAZOLAM HYDROCHLORIDE 2 MILLIGRAM(S): 1 INJECTION, SOLUTION INTRAMUSCULAR; INTRAVENOUS at 13:42

## 2023-07-03 NOTE — ECT TREATMENT NOTE - NSECTCOMMENTS_PSY_ALL_CORE
Pt states he had a really good weekend. Was able to go to friends bday party and enjoyed being with peers. No SI since last visit.  Spoke about plan, pt has anticipated 2x/week treatment would end this week as he has school starting in 2 days which has classes M-Th.  Pt would like to return for #12 on Friday (if available) and then continue on weekly mECT schedule.   Understands if he is unstable or feels he is still improving, we have option to have 2x/week ECT again (if needed).

## 2023-07-05 ENCOUNTER — RESULT REVIEW (OUTPATIENT)
Age: 28
End: 2023-07-05

## 2023-07-05 ENCOUNTER — APPOINTMENT (OUTPATIENT)
Dept: MRI IMAGING | Facility: HOSPITAL | Age: 28
End: 2023-07-05

## 2023-07-05 ENCOUNTER — OUTPATIENT (OUTPATIENT)
Dept: OUTPATIENT SERVICES | Facility: HOSPITAL | Age: 28
LOS: 1 days | End: 2023-07-05
Payer: SUBSIDIZED

## 2023-07-05 DIAGNOSIS — Z00.00 ENCOUNTER FOR GENERAL ADULT MEDICAL EXAMINATION WITHOUT ABNORMAL FINDINGS: ICD-10-CM

## 2023-07-05 DIAGNOSIS — Z00.6 ENCOUNTER FOR EXAMINATION FOR NORMAL COMPARISON AND CONTROL IN CLINICAL RESEARCH PROGRAM: ICD-10-CM

## 2023-07-05 PROCEDURE — 70551 MRI BRAIN STEM W/O DYE: CPT | Mod: 26

## 2023-07-05 PROCEDURE — 70551 MRI BRAIN STEM W/O DYE: CPT

## 2023-07-07 PROCEDURE — 90870 ELECTROCONVULSIVE THERAPY: CPT

## 2023-07-07 RX ORDER — MIDAZOLAM HYDROCHLORIDE 1 MG/ML
2 INJECTION, SOLUTION INTRAMUSCULAR; INTRAVENOUS ONCE
Refills: 0 | Status: DISCONTINUED | OUTPATIENT
Start: 2023-07-07 | End: 2023-07-07

## 2023-07-07 RX ADMIN — MIDAZOLAM HYDROCHLORIDE 2 MILLIGRAM(S): 1 INJECTION, SOLUTION INTRAMUSCULAR; INTRAVENOUS at 13:32

## 2023-07-07 NOTE — ECT TREATMENT NOTE - NSCGIIMPROVESX_PSY_ALL_CORE
4 = No change - symptoms remain essentially unchanged
4 = No change - symptoms remain essentially unchanged
2 = Much improved - notably better with signficant reduction of symptoms; increase in the level of functioning but some symptoms remain
4 = No change - symptoms remain essentially unchanged
2 = Much improved - notably better with signficant reduction of symptoms; increase in the level of functioning but some symptoms remain
4 = No change - symptoms remain essentially unchanged

## 2023-07-07 NOTE — ECT TREATMENT NOTE - NSCGISEVERILLNESS_PSY_ALL_CORE
2 = Borderline mentally ill – subtle or suspected pathology
4 = Moderately ill – overt symptoms causing noticeable, but modest, functional impairment or distress; symptom level may warrant medication
4 = Moderately ill – overt symptoms causing noticeable, but modest, functional impairment or distress; symptom level may warrant medication
3 = Mildly ill – clearly established symptoms with minimal, if any, distress or difficulty in social and occupational function
4 = Moderately ill – overt symptoms causing noticeable, but modest, functional impairment or distress; symptom level may warrant medication
4 = Moderately ill – overt symptoms causing noticeable, but modest, functional impairment or distress; symptom level may warrant medication

## 2023-07-07 NOTE — ECT TREATMENT NOTE - NSECTCOMMENTS_PSY_ALL_CORE
Patient was calm and cooperative. Said that he's doing better and started taking piano lessons weekly and he's excited about it. Denied feeling depressed ok. Good sleep and appetite. He feels ready to space the sessions to one a week. He was told to call earlier, if needed.

## 2023-07-10 ENCOUNTER — APPOINTMENT (OUTPATIENT)
Dept: PSYCHIATRY | Facility: CLINIC | Age: 28
End: 2023-07-10
Payer: COMMERCIAL

## 2023-07-10 PROCEDURE — 90837 PSYTX W PT 60 MINUTES: CPT

## 2023-07-11 NOTE — PLAN
[Recommended Frequency of Visits: ____] : Recommended frequency of visits: [unfilled] [Return in ____ week(s)] : Return in [unfilled] week(s) [FreeTextEntry2] : Increased understanding of anxious feelings\par Learn relaxation techniques to reduce anxiety (thought stopping, thought switching, creative visualization, progressive muscle relaxation, deep breathing)\par Learn about adverse childhood experiences and healing childhood attachment injuries (IFS)\par Engage in a productive activity \par Follow Safety Plan  [Cognitive and/or Behavior Therapy] : Cognitive and/or Behavior Therapy  [Skills training (all types)] : Skills training (all types)  [Supportive Therapy] : Supportive Therapy [Other: ____] : [unfilled] [de-identified] : Pt. was engaged in session; affect and mood WNL. This session focused on the progress of his ECT treatment. Pt. reports he has decreased his treatment to once per week, and believes there has been an improvement in his overall mood. Pt. reports he has not seriously thought about suicide and self harm for several weeks. Pt. reports he is currently enrolled in a piano course in [employer], and is enjoying this experience. Pt. reports he notices himself forgetting certain things and inaccurately remembering others. Pt. reports when he returns to ECT treatment on Friday he will mention this to his doctor. SW empathized with pt. and reinforced  his engagement in productive activities and commitment to treatment.  [FreeTextEntry1] : Meet for weekly individual counseling, level of care is appropriate. Pt. will refer to safety plan.

## 2023-07-12 ENCOUNTER — OUTPATIENT (OUTPATIENT)
Dept: OUTPATIENT SERVICES | Facility: HOSPITAL | Age: 28
LOS: 1 days | End: 2023-07-12
Payer: SUBSIDIZED

## 2023-07-12 ENCOUNTER — APPOINTMENT (OUTPATIENT)
Dept: MRI IMAGING | Facility: HOSPITAL | Age: 28
End: 2023-07-12

## 2023-07-12 ENCOUNTER — RESULT REVIEW (OUTPATIENT)
Age: 28
End: 2023-07-12

## 2023-07-12 DIAGNOSIS — Z00.6 ENCOUNTER FOR EXAMINATION FOR NORMAL COMPARISON AND CONTROL IN CLINICAL RESEARCH PROGRAM: ICD-10-CM

## 2023-07-12 PROCEDURE — 70551 MRI BRAIN STEM W/O DYE: CPT

## 2023-07-12 PROCEDURE — 70551 MRI BRAIN STEM W/O DYE: CPT | Mod: 26

## 2023-07-14 PROCEDURE — 90870 ELECTROCONVULSIVE THERAPY: CPT

## 2023-07-14 RX ORDER — MIDAZOLAM HYDROCHLORIDE 1 MG/ML
2 INJECTION, SOLUTION INTRAMUSCULAR; INTRAVENOUS ONCE
Refills: 0 | Status: DISCONTINUED | OUTPATIENT
Start: 2023-07-14 | End: 2023-07-14

## 2023-07-14 RX ADMIN — MIDAZOLAM HYDROCHLORIDE 2 MILLIGRAM(S): 1 INJECTION, SOLUTION INTRAMUSCULAR; INTRAVENOUS at 15:12
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
## 2023-07-17 ENCOUNTER — APPOINTMENT (OUTPATIENT)
Dept: PSYCHIATRY | Facility: CLINIC | Age: 28
End: 2023-07-17
Payer: COMMERCIAL

## 2023-07-17 PROCEDURE — 90837 PSYTX W PT 60 MINUTES: CPT

## 2023-07-18 NOTE — PLAN
[Recommended Frequency of Visits: ____] : Recommended frequency of visits: [unfilled] [Return in ____ week(s)] : Return in [unfilled] week(s) [FreeTextEntry2] : Increased understanding of anxious feelings\par Learn relaxation techniques to reduce anxiety (thought stopping, thought switching, creative visualization, progressive muscle relaxation, deep breathing)\par Learn about adverse childhood experiences and healing childhood attachment injuries (IFS)\par Engage in a productive activity \par Follow Safety Plan  [Cognitive and/or Behavior Therapy] : Cognitive and/or Behavior Therapy  [Skills training (all types)] : Skills training (all types)  [Supportive Therapy] : Supportive Therapy [de-identified] : Pt. was engaged in session; affect and mood WNL. This session pt. and SW discussed and explored the improvement he has been noticing since beginning his ECT treatment. Pt. reports his mood has improved "for the better" and he has not been thinking about ending his life or how hopeless he is. Pt. provided an example in where he passed a girl walking and his default was not an attack on himself, but rather the thought that she seems "like someone nice." Pt. also reports that his class at the [employer] is going well, and he is enjoying learning the piano. SW validated patients progress and worked with him on identifying the connection between his thoughts, feelings and behaviors.  [FreeTextEntry1] : Meet for weekly individual counseling, level of care is appropriate. Pt. will refer to safety plan.

## 2023-07-19 ENCOUNTER — OUTPATIENT (OUTPATIENT)
Dept: OUTPATIENT SERVICES | Facility: HOSPITAL | Age: 28
LOS: 1 days | End: 2023-07-19
Payer: SUBSIDIZED

## 2023-07-19 ENCOUNTER — RESULT REVIEW (OUTPATIENT)
Age: 28
End: 2023-07-19

## 2023-07-19 ENCOUNTER — APPOINTMENT (OUTPATIENT)
Dept: MRI IMAGING | Facility: HOSPITAL | Age: 28
End: 2023-07-19

## 2023-07-19 DIAGNOSIS — Z00.00 ENCOUNTER FOR GENERAL ADULT MEDICAL EXAMINATION WITHOUT ABNORMAL FINDINGS: ICD-10-CM

## 2023-07-19 DIAGNOSIS — Z00.6 ENCOUNTER FOR EXAMINATION FOR NORMAL COMPARISON AND CONTROL IN CLINICAL RESEARCH PROGRAM: ICD-10-CM

## 2023-07-19 PROCEDURE — 70551 MRI BRAIN STEM W/O DYE: CPT | Mod: 26

## 2023-07-19 PROCEDURE — 70551 MRI BRAIN STEM W/O DYE: CPT

## 2023-07-21 PROCEDURE — 90870 ELECTROCONVULSIVE THERAPY: CPT

## 2023-07-21 RX ORDER — MIDAZOLAM HYDROCHLORIDE 1 MG/ML
2 INJECTION, SOLUTION INTRAMUSCULAR; INTRAVENOUS ONCE
Refills: 0 | Status: DISCONTINUED | OUTPATIENT
Start: 2023-07-21 | End: 2023-07-21

## 2023-07-21 RX ADMIN — MIDAZOLAM HYDROCHLORIDE 2 MILLIGRAM(S): 1 INJECTION, SOLUTION INTRAMUSCULAR; INTRAVENOUS at 14:12

## 2023-07-21 NOTE — ECT TREATMENT NOTE - NSECTCOMMENTS_PSY_ALL_CORE
1st weekly tx. Patient stable through the entire treatment interval without recurrence of presenting symptoms. Mood, energy, sleep, and appetite were within normal limits. Enjoying his piano lessons very much; upbeat and social with staff. States he has been having daily wilmer vu this week, which is new, but no cognitive concerns. Will monitor. We will continue maintenance ECT to help reduce the chances of relapse.

## 2023-07-24 ENCOUNTER — APPOINTMENT (OUTPATIENT)
Dept: PSYCHIATRY | Facility: CLINIC | Age: 28
End: 2023-07-24
Payer: COMMERCIAL

## 2023-07-24 PROCEDURE — 90837 PSYTX W PT 60 MINUTES: CPT

## 2023-07-25 NOTE — PLAN
[Recommended Frequency of Visits: ____] : Recommended frequency of visits: [unfilled] [Return in ____ week(s)] : Return in [unfilled] week(s) [FreeTextEntry2] : Increased understanding of anxious feelings\par Learn relaxation techniques to reduce anxiety (thought stopping, thought switching, creative visualization, progressive muscle relaxation, deep breathing)\par Learn about adverse childhood experiences and healing childhood attachment injuries (IFS)\par Engage in a productive activity \par Follow Safety Plan  [Psychoeducation] : Psychoeducation  [Supportive Therapy] : Supportive Therapy [Other: ____] : [unfilled] [de-identified] : Pt. was engaged in session; affect and mood WNL. This session pt. spoke about the observations he has noticed in his mood and thoughts. Pt. reports he still has not been thinking about self harm and suicide for the past several months. Pt. reports the part of him that has contemplated suicide does not feel "ignored or dismissed" but rather does not even think about not thinking about this, and has been spending more time on activities such as video games and attending his summer class. Pt. does report that his memory has been impacted by his ECT treatment, and he will mention this next visit to his doctor. SW empathized with pt. and reinforced his efforts at self care and following through on attending his summer class.  [FreeTextEntry1] : Meet for weekly individual counseling, level of care is appropriate. Pt. will refer to safety plan.

## 2023-07-26 ENCOUNTER — OUTPATIENT (OUTPATIENT)
Dept: OUTPATIENT SERVICES | Facility: HOSPITAL | Age: 28
LOS: 1 days | End: 2023-07-26
Payer: SUBSIDIZED

## 2023-07-26 ENCOUNTER — APPOINTMENT (OUTPATIENT)
Dept: MRI IMAGING | Facility: HOSPITAL | Age: 28
End: 2023-07-26

## 2023-07-26 ENCOUNTER — RESULT REVIEW (OUTPATIENT)
Age: 28
End: 2023-07-26

## 2023-07-26 DIAGNOSIS — Z00.00 ENCOUNTER FOR GENERAL ADULT MEDICAL EXAMINATION WITHOUT ABNORMAL FINDINGS: ICD-10-CM

## 2023-07-26 PROCEDURE — 70551 MRI BRAIN STEM W/O DYE: CPT | Mod: 26

## 2023-07-26 PROCEDURE — 70551 MRI BRAIN STEM W/O DYE: CPT

## 2023-07-28 PROCEDURE — 90870 ELECTROCONVULSIVE THERAPY: CPT

## 2023-07-28 RX ORDER — MIDAZOLAM HYDROCHLORIDE 1 MG/ML
2 INJECTION, SOLUTION INTRAMUSCULAR; INTRAVENOUS ONCE
Refills: 0 | Status: DISCONTINUED | OUTPATIENT
Start: 2023-07-28 | End: 2023-07-28

## 2023-07-28 RX ADMIN — MIDAZOLAM HYDROCHLORIDE 2 MILLIGRAM(S): 1 INJECTION, SOLUTION INTRAMUSCULAR; INTRAVENOUS at 14:31

## 2023-07-28 NOTE — ECT TREATMENT NOTE - NSECTCOMMENTS_PSY_ALL_CORE
Today was the 3rd weekly treatment. Mr. Pollock reports that he is feeling significantly better. Sleep: normal, appetite: good. Energy: ok, feels tired in the evening. Rates his depression as 2-3/10. He is able to enjoy video games as well as his Piano classes and walking his dog. He reports he is looking forward to the 3 classes in college that he is planning to take in September. He feels that he has positive attitude towards it compared to past. Denies any death wish/suicidal ideation/intent/plan.     Recommend one more weekly treatment and then consider increasing the interval.     We will need a new treatment plan from Dr. Arrieta.

## 2023-07-31 ENCOUNTER — APPOINTMENT (OUTPATIENT)
Dept: PSYCHIATRY | Facility: CLINIC | Age: 28
End: 2023-07-31
Payer: COMMERCIAL

## 2023-07-31 PROCEDURE — 99214 OFFICE O/P EST MOD 30 MIN: CPT

## 2023-07-31 PROCEDURE — 90837 PSYTX W PT 60 MINUTES: CPT

## 2023-07-31 NOTE — HISTORY OF PRESENT ILLNESS
[de-identified] : Patient is here in the office for face-to-face interview with writer for 1 month follow-up visit.  Patient states he has had >10 sessions so far with ECT. States his way of thinking ahs changed. States he felt good about taking 3 classes at [employer]. "In the back of my mind t felt very positive." Taking online classes for piano which he enjoys.   Mood: Less depression and anxiety. States his only concerns is how long he must continue doing this ECT. Sleep: states he goes to sleep at 9 pm and wakes up every morning at 4 or 5 am. Able to go back to sleep.  Appetite: is good Energy: tired at night. States it is good during the day Concentration and motivation are all good.

## 2023-07-31 NOTE — FAMILY HISTORY
[FreeTextEntry1] : Patient was born in [place]. Mother 52 is a cartographer. Father 55 is a . He has no brothers or sisters. His maternal cousin who had mood swings and her maternal grandmother who was bipolar. There is no other psychiatric alcohol or drug history in the family there is no abuse history growing up.
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
## 2023-07-31 NOTE — DISCUSSION/SUMMARY
[FreeTextEntry1] : Assessment: Patient is a 26-year-old male with h/o anxiety and depression seen today for medication management. Patient is compliant with his medications, tolerating them well without any side effects. I-STOP was checked without any issues.   PLAN: Continue with ECT weekly. Continue Wellbutrin 300 mg PO QAM for depression, low motivation, concentration energy, and SSRI induced sexual dysfunction Continue Lexapro 20 mg PO QD for depression and anxiety. - Discussed risks and benefits of medications including side effects of GI and sexual side effects with SSRI. Alternative strategies including no intervention discussed with patient. Patient consents to current medications as prescribed - Patient understands to contact clinic prn with concerns and agrees to call 911 or go to nearest ER if symptoms worsen. - Next appointment made in 1 month. Patient left the office without any distress

## 2023-07-31 NOTE — SOCIAL HISTORY
[FreeTextEntry1] : Patient grew up in [place]. He [facility] [employer]. He graduated in 2009. He was an honor student. He participated no activities or sports. He still has 3 friends from [employer] school that he sees on a regular basis. The patient then went to [employer] graduating in 2013. His grades were in the 90s. He participated no activities and had no friends. Patient currently is student at [employer] in [place]. He has a 3.2 grade point average in biochemistry. He has no friends. The patient plays video games. He currently has a purple belt and Chinese martial arts.

## 2023-08-02 NOTE — PLAN
[FreeTextEntry2] : Increased understanding of anxious feelings\par  Learn relaxation techniques to reduce anxiety (thought stopping, thought switching, creative visualization, progressive muscle relaxation, deep breathing)\par  Learn about adverse childhood experiences and healing childhood attachment injuries (IFS)\par  Engage in a productive activity \par  Follow Safety Plan  [Cognitive and/or Behavior Therapy] : Cognitive and/or Behavior Therapy  [Psychodynamic Therapy] : Psychodynamic Therapy  [Psychoeducation] : Psychoeducation  [Supportive Therapy] : Supportive Therapy [de-identified] : Pt. was engaged in session; affect and mood WNL. This session explored patients progress as far as taking positive action towards bettering his mental health. Pt. 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 Pt. reports he has been enjoying his piano course that he has been taking this summer, and purchased a keyboard so he can practice at home. Pt. believes his memory has been affected by the ECT treatment, and hopes it is a temporary symptom of the treatment. SW empathized with pt. and worked with him in reviewing self-care practices and challenging automatic thoughts.  [Recommended Frequency of Visits: ____] : Recommended frequency of visits: [unfilled] [Return in ____ week(s)] : Return in [unfilled] week(s) [FreeTextEntry1] : Meet for weekly individual counseling, level of care is appropriate. Pt. will refer to safety plan.

## 2023-08-04 PROCEDURE — 90870 ELECTROCONVULSIVE THERAPY: CPT

## 2023-08-04 RX ORDER — MIDAZOLAM HYDROCHLORIDE 1 MG/ML
2 INJECTION, SOLUTION INTRAMUSCULAR; INTRAVENOUS ONCE
Refills: 0 | Status: DISCONTINUED | OUTPATIENT
Start: 2023-08-04 | End: 2023-08-04

## 2023-08-04 RX ADMIN — MIDAZOLAM HYDROCHLORIDE 2 MILLIGRAM(S): 1 INJECTION, SOLUTION INTRAMUSCULAR; INTRAVENOUS at 14:31
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
## 2023-08-07 ENCOUNTER — APPOINTMENT (OUTPATIENT)
Dept: PSYCHIATRY | Facility: CLINIC | Age: 28
End: 2023-08-07
Payer: COMMERCIAL

## 2023-08-07 PROCEDURE — 90837 PSYTX W PT 60 MINUTES: CPT

## 2023-08-08 NOTE — PLAN
[FreeTextEntry2] : Increased understanding of anxious feelings\par  Learn relaxation techniques to reduce anxiety (thought stopping, thought switching, creative visualization, progressive muscle relaxation, deep breathing)\par  Learn about adverse childhood experiences and healing childhood attachment injuries (IFS)\par  Engage in a productive activity \par  Follow Safety Plan  [Cognitive and/or Behavior Therapy] : Cognitive and/or Behavior Therapy  [Skills training (all types)] : Skills training (all types)  [Supportive Therapy] : Supportive Therapy [de-identified] : Pt. was engaged in session; affect and mood WNL. This session pt. and SW explored his most recent ECT treatment episode. Pt. reports he has noticed a marked improvement in his mood and a decrease in suicidal ideations. However, pt. reports he is concerned about his short-term memory loss and is considering moving to once per week treatment of ECT. Pt. indicates he expressed this to the doctor administering his treatment, and as in agreement with this decision. SW worked with pt. on behavioral activation strategies and identifying his cycles of specific events, emotions and responses that trigger his depressed part.  [Recommended Frequency of Visits: ____] : Recommended frequency of visits: [unfilled] [Return in ____ week(s)] : Return in [unfilled] week(s) [FreeTextEntry1] : Meet for weekly individual counseling, level of care is appropriate. Pt. will refer to safety plan.

## 2023-08-14 ENCOUNTER — APPOINTMENT (OUTPATIENT)
Dept: PSYCHIATRY | Facility: CLINIC | Age: 28
End: 2023-08-14
Payer: COMMERCIAL

## 2023-08-14 PROCEDURE — 90837 PSYTX W PT 60 MINUTES: CPT

## 2023-08-17 PROCEDURE — 90870 ELECTROCONVULSIVE THERAPY: CPT

## 2023-08-17 RX ORDER — MIDAZOLAM HYDROCHLORIDE 1 MG/ML
2 INJECTION, SOLUTION INTRAMUSCULAR; INTRAVENOUS ONCE
Refills: 0 | Status: DISCONTINUED | OUTPATIENT
Start: 2023-08-17 | End: 2023-08-17

## 2023-08-17 RX ADMIN — MIDAZOLAM HYDROCHLORIDE 2 MILLIGRAM(S): 1 INJECTION, SOLUTION INTRAMUSCULAR; INTRAVENOUS at 13:54
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
## 2023-08-18 NOTE — PLAN
[FreeTextEntry2] : Increased understanding of anxious feelings\par  Learn relaxation techniques to reduce anxiety (thought stopping, thought switching, creative visualization, progressive muscle relaxation, deep breathing)\par  Learn about adverse childhood experiences and healing childhood attachment injuries (IFS)\par  Engage in a productive activity \par  Follow Safety Plan  [[place] Therapy] : [place] Therapy  [Skills training (all types)] : Skills training (all types)  [Supportive Therapy] : Supportive Therapy [de-identified] : Pt. was engaged in session; affect and mood WNL. This session pt. spoke about the part of him that feels indifferent to his future and bettering himself, and what the point is of him trying to take care of himself. Pt. reports that he hoped enrolling in community college would help him move further away from his suicidal and apathetic parts. Pt. spoke about the state of affairs in society and the hopelessness that he feels. SW empathized with pt. and helped him identify and explore what his hopeless and suicidal part are feeling and afraid of.  [Recommended Frequency of Visits: ____] : Recommended frequency of visits: [unfilled] [Return in ____ week(s)] : Return in [unfilled] week(s) [FreeTextEntry1] : Meet for weekly individual counseling, level of care is appropriate. Pt. will refer to safety plan.

## 2023-08-28 ENCOUNTER — APPOINTMENT (OUTPATIENT)
Dept: PSYCHIATRY | Facility: CLINIC | Age: 28
End: 2023-08-28
Payer: COMMERCIAL

## 2023-08-28 PROCEDURE — 90837 PSYTX W PT 60 MINUTES: CPT

## 2023-08-30 NOTE — PLAN
[FreeTextEntry2] : Increased understanding of anxious feelings\par  Learn relaxation techniques to reduce anxiety (thought stopping, thought switching, creative visualization, progressive muscle relaxation, deep breathing)\par  Learn about adverse childhood experiences and healing childhood attachment injuries (IFS)\par  Engage in a productive activity \par  Follow Safety Plan  [Skills training (all types)] : Skills training (all types)  [Supportive Therapy] : Supportive Therapy [Other: ____] : [unfilled] [de-identified] : Pt. was engaged in session; affect and mood WNL. This session explored patients' part that feels hopeless about the future of society, and its conclusion that suicide is the best way out of this reality. Pt. spoke about his suicide protector, and the fears it has. Pt. shared that this part of him does not see any hope for the state of society, and that it does not feel encouraged about making an effort to find meaning in life if life is this "hard." LORNA empathized with pt. and followed his thinking and feelings from this protector part via IFS. Pt. verbalized that he does not have any plans or intentions on harming himself or others.  [Recommended Frequency of Visits: ____] : Recommended frequency of visits: [unfilled] [Return in ____ week(s)] : Return in [unfilled] week(s) [FreeTextEntry1] : Meet for weekly individual counseling, level of care is appropriate. Pt. will refer to safety plan.

## 2023-09-08 PROCEDURE — 90870 ELECTROCONVULSIVE THERAPY: CPT

## 2023-09-08 RX ORDER — MIDAZOLAM HYDROCHLORIDE 1 MG/ML
2 INJECTION, SOLUTION INTRAMUSCULAR; INTRAVENOUS ONCE
Refills: 0 | Status: DISCONTINUED | OUTPATIENT
Start: 2023-09-08 | End: 2023-09-08

## 2023-09-08 RX ADMIN — MIDAZOLAM HYDROCHLORIDE 2 MILLIGRAM(S): 1 INJECTION, SOLUTION INTRAMUSCULAR; INTRAVENOUS at 13:50

## 2023-09-08 NOTE — ECT TREATMENT NOTE - NSECTCOMMENTS_PSY_ALL_CORE
Patient stable through the extended treatment interval without recurrence of presenting symptoms. He stated that he was unable to come for his appointment one week ago. Mood, energy, sleep, and appetite were within normal limits. C/O memory issues; took longer to recall the names of his distant family members and psychiatrist (whom he quickly named today). C/o anxiety around treatment. Reviewed usual course of recovery for cognitive issues incurred with ECT. We will continue maintenance ECT to help reduce the chances of relapse.

## 2023-09-11 ENCOUNTER — APPOINTMENT (OUTPATIENT)
Dept: PSYCHIATRY | Facility: CLINIC | Age: 28
End: 2023-09-11
Payer: COMMERCIAL

## 2023-09-11 PROCEDURE — 90837 PSYTX W PT 60 MINUTES: CPT

## 2023-09-12 ENCOUNTER — APPOINTMENT (OUTPATIENT)
Dept: PSYCHIATRY | Facility: CLINIC | Age: 28
End: 2023-09-12
Payer: COMMERCIAL

## 2023-09-12 PROCEDURE — 99214 OFFICE O/P EST MOD 30 MIN: CPT

## 2023-09-18 ENCOUNTER — APPOINTMENT (OUTPATIENT)
Dept: PSYCHIATRY | Facility: CLINIC | Age: 28
End: 2023-09-18
Payer: COMMERCIAL

## 2023-09-18 PROCEDURE — 90837 PSYTX W PT 60 MINUTES: CPT

## 2023-09-22 PROCEDURE — 90870 ELECTROCONVULSIVE THERAPY: CPT

## 2023-09-22 RX ORDER — MIDAZOLAM HYDROCHLORIDE 1 MG/ML
2 INJECTION, SOLUTION INTRAMUSCULAR; INTRAVENOUS ONCE
Refills: 0 | Status: DISCONTINUED | OUTPATIENT
Start: 2023-09-22 | End: 2023-09-22

## 2023-09-22 RX ADMIN — MIDAZOLAM HYDROCHLORIDE 2 MILLIGRAM(S): 1 INJECTION, SOLUTION INTRAMUSCULAR; INTRAVENOUS at 14:03

## 2023-09-22 NOTE — ECT TREATMENT NOTE - NSECTCOMMENTS_PSY_ALL_CORE
Patient stable through the entire treatment interval without recurrence of presenting symptoms. Mood, energy, sleep, and appetite were within normal limits. Focused on classwork in virtual, asynhchronous classes. We will continue maintenance ECT to help reduce the chances of relapse.

## 2023-09-25 ENCOUNTER — APPOINTMENT (OUTPATIENT)
Dept: PSYCHIATRY | Facility: CLINIC | Age: 28
End: 2023-09-25

## 2023-10-02 ENCOUNTER — APPOINTMENT (OUTPATIENT)
Dept: PSYCHIATRY | Facility: CLINIC | Age: 28
End: 2023-10-02
Payer: COMMERCIAL

## 2023-10-02 PROCEDURE — 90837 PSYTX W PT 60 MINUTES: CPT

## 2023-10-13 PROCEDURE — 90870 ELECTROCONVULSIVE THERAPY: CPT

## 2023-10-13 RX ORDER — MIDAZOLAM HYDROCHLORIDE 1 MG/ML
2 INJECTION, SOLUTION INTRAMUSCULAR; INTRAVENOUS ONCE
Refills: 0 | Status: DISCONTINUED | OUTPATIENT
Start: 2023-10-13 | End: 2023-10-13

## 2023-10-13 RX ADMIN — MIDAZOLAM HYDROCHLORIDE 2 MILLIGRAM(S): 1 INJECTION, SOLUTION INTRAMUSCULAR; INTRAVENOUS at 13:46

## 2023-10-13 NOTE — ECT TREATMENT NOTE - NSECTPOSTTXMEDS_PSY_ALL_CORE
2 mg IV Push

## 2023-10-13 NOTE — ECT TREATMENT NOTE - NSECTCOMMENTS_PSY_ALL_CORE
Patient stable through the entire treatment interval without recurrence of presenting symptoms. Mood, energy, sleep, and appetite were within normal limits. Mildly troubled by lapse in memory of an activity (going to dog park) in 2020-21 when he was not getting ECT.  Can decrease frequency of treatment to q1 month.  We will continue maintenance ECT to help reduce the chances of relapse.

## 2023-10-16 ENCOUNTER — APPOINTMENT (OUTPATIENT)
Dept: PSYCHIATRY | Facility: CLINIC | Age: 28
End: 2023-10-16
Payer: COMMERCIAL

## 2023-10-16 PROCEDURE — 90837 PSYTX W PT 60 MINUTES: CPT

## 2023-10-23 ENCOUNTER — APPOINTMENT (OUTPATIENT)
Dept: PSYCHIATRY | Facility: CLINIC | Age: 28
End: 2023-10-23
Payer: COMMERCIAL

## 2023-10-23 PROCEDURE — 90837 PSYTX W PT 60 MINUTES: CPT

## 2023-10-30 ENCOUNTER — APPOINTMENT (OUTPATIENT)
Dept: PSYCHIATRY | Facility: CLINIC | Age: 28
End: 2023-10-30

## 2023-11-02 ENCOUNTER — APPOINTMENT (OUTPATIENT)
Dept: PSYCHIATRY | Facility: CLINIC | Age: 28
End: 2023-11-02
Payer: COMMERCIAL

## 2023-11-02 PROCEDURE — 90837 PSYTX W PT 60 MINUTES: CPT | Mod: GT

## 2023-11-06 ENCOUNTER — APPOINTMENT (OUTPATIENT)
Dept: PSYCHIATRY | Facility: CLINIC | Age: 28
End: 2023-11-06
Payer: COMMERCIAL

## 2023-11-06 PROCEDURE — 90837 PSYTX W PT 60 MINUTES: CPT

## 2023-11-13 ENCOUNTER — APPOINTMENT (OUTPATIENT)
Dept: PSYCHIATRY | Facility: CLINIC | Age: 28
End: 2023-11-13
Payer: COMMERCIAL

## 2023-11-13 PROCEDURE — 90837 PSYTX W PT 60 MINUTES: CPT

## 2023-11-20 ENCOUNTER — APPOINTMENT (OUTPATIENT)
Dept: PSYCHIATRY | Facility: CLINIC | Age: 28
End: 2023-11-20
Payer: COMMERCIAL

## 2023-11-20 PROCEDURE — 90837 PSYTX W PT 60 MINUTES: CPT

## 2023-11-24 VITALS
HEART RATE: 83 BPM | DIASTOLIC BLOOD PRESSURE: 68 MMHG | TEMPERATURE: 98 F | OXYGEN SATURATION: 100 % | SYSTOLIC BLOOD PRESSURE: 119 MMHG | RESPIRATION RATE: 16 BRPM

## 2023-11-24 PROCEDURE — 90870 ELECTROCONVULSIVE THERAPY: CPT

## 2023-11-24 RX ORDER — MIDAZOLAM HYDROCHLORIDE 1 MG/ML
2 INJECTION, SOLUTION INTRAMUSCULAR; INTRAVENOUS ONCE
Refills: 0 | Status: DISCONTINUED | OUTPATIENT
Start: 2023-11-24 | End: 2024-03-22

## 2023-11-24 NOTE — ECT PRE-PROCEDURE CHECKLIST - PATIENT'S GENDER IDENTITY
Male

## 2023-11-24 NOTE — ECT TREATMENT NOTE - TEMPERATURE IN CELSIUS (DEGREES C)
37.1
37.1
37.2
37.3
36.7
37.3
37.1
36.4
37.3
36.6
36.7
36.9
37.2
37.3
37.2
37.3
36.9
37.3
36.7
36.9
37.1

## 2023-11-24 NOTE — ECT PRE-PROCEDURE CHECKLIST - CAREGIVER RELATION TO PATIENT
father 

## 2023-11-24 NOTE — ECT AMBULATORY DISCHARGE PLAN - NSECTPROCEDUREDATE_PSY_ALL_CORE
23-Jun-2023
14-Jul-2023
15-Sanchez-2023
08-Jun-2023
21-Jul-2023
01-Jun-2023
04-Aug-2023
12-Jun-2023
22-Sep-2023
30-Jun-2023
13-Oct-2023
17-Aug-2023
20-Jun-2023
27-Jun-2023
07-Jul-2023
24-Nov-2023
30-May-2023
28-Jul-2023
03-Jul-2023
06-Jun-2023
08-Sep-2023

## 2023-11-24 NOTE — ECT PRE-PROCEDURE CHECKLIST - NSPROEXTENSOFSELF_PSY_ALL_CORE
none
contact lenses
none
none
contact lenses
none
none
contact lenses
none

## 2023-11-24 NOTE — ECT AMBULATORY DISCHARGE PLAN - NSPOSTECTCONTPROV_PSY_ALL_CORE
[facility] ([facility]) ECT Suite at [phone]
[facility] ([facility]) ECT Suite at [phone]
[facility] ([facility]) ECT Suite at [phone]
[facility] ([facility]) ECT Suite at [phone]
[facility] ([facility]) ECT Suite at [phone]
[facility] ([facility]) ECT Suite at [phone]
[facility] ([facility]) ECT Suite at [phone]
[facility] ([facility]) ECT Suite at [phone]
[facility] ([facility]) ECT Suite at [phone]
[facility] ([facility]) ECT Suite at [phone]
[facility] ([facility]) ECT Suite at [phone]
[facility] ([facility]) ECT Suite at [phone]
[facility] ([facility]) ECT Suite at [phone]
[facility] ([facility]) ECT Suite at [phone]
[facility] ([facility]) ECT Suite at [phone]
[facility] ([facility]) ECT Suite at [phone]
[facility] ([facility]) ECT Suite at [phone]
[facility] ([facility]) ECT Suite at [phone]
[facility] ([facility]) ECT Suite at [phone]
[facility] ([facility]) ECT Suite at [phone]
[facility] ([facility]) ECT Suite at [phone]

## 2023-11-24 NOTE — ECT TREATMENT NOTE - NSECTPOSTTXSUMMARY_PSY_ALL_CORE
The patient had a well modified grand mal seizure under general anesthesia and muscle relaxant. The patient is alert, responsive, in no acute distress.  Recovery uneventful.
The patient had a well modified grand mal seizure under general anesthesia and a muscle relaxant.  The patient is alert, responsive, in no acute distress.  Recovery uneventful. 
The patient had a well modified grand mal seizure under general anesthesia and a muscle relaxant.  The patient is alert, responsive, in no acute distress.  Recovery uneventful. 
The patient had a well modified grand mal seizure under general anesthesia and muscle relaxant. The patient is alert, responsive, in no acute distress.  Recovery uneventful.
The patient had a well modified grand mal seizure under general anesthesia and muscle relaxant. The patient is alert, responsive, in no acute distress.  Recovery uneventful.
The patient had a well modified grand mal seizure under general anesthesia and a muscle relaxant.  The patient is alert, responsive, in no acute distress.  Recovery uneventful. 
The patient had a well modified grand mal seizure under general anesthesia and muscle relaxant. The patient is alert, responsive, in no acute distress.  Recovery uneventful.
The patient had a well modified grand mal seizure under general anesthesia and muscle relaxant. The patient is alert, responsive, in no acute distress.  Recovery uneventful. 
The patient had a well modified grand mal seizure under general anesthesia and muscle relaxant.  The patient is alert, responsive, and in no acute distress. Recovery uneventful.
The patient had a well modified grand mal seizure under general anesthesia and muscle relaxant.  The patient is alert, responsive, and in no acute distress. Recovery uneventful.
The patient had a well modified grand mal seizure under general anesthesia and muscle relaxant. The patient is alert, responsive, in no acute distress.  Recovery uneventful.

## 2023-11-24 NOTE — ECT TREATMENT NOTE - NSECTRECTXSCHED_PSY_ALL_CORE
Acute Course 2X per week
Weekly
Every 3 weeks
Monthly
Acute Course 2X per week
Every 2 weeks
Acute Course 2X per week
Every 2 weeks
Weekly
Acute Course 2X per week
Every 3 weeks
Acute Course 2X per week
Weekly
Weekly
Acute Course 2X per week
Monthly
Acute Course 2X per week

## 2023-11-24 NOTE — ECT TREATMENT NOTE - NSECTCPTCODE_PSY_ALL_CORE
79534 (ECT-Electroconvulsive Therapy)
44147 (ECT-Electroconvulsive Therapy)
99223 (ECT-Electroconvulsive Therapy)
97546 (ECT-Electroconvulsive Therapy)
63568 (ECT-Electroconvulsive Therapy)
23834 (ECT-Electroconvulsive Therapy)
73130 (ECT-Electroconvulsive Therapy)
75048 (ECT-Electroconvulsive Therapy)
84610 (ECT-Electroconvulsive Therapy)
30992 (ECT-Electroconvulsive Therapy)
10017 (ECT-Electroconvulsive Therapy)
33015 (ECT-Electroconvulsive Therapy)
74770 (ECT-Electroconvulsive Therapy)
16074 (ECT-Electroconvulsive Therapy)
44650 (ECT-Electroconvulsive Therapy)
14704 (ECT-Electroconvulsive Therapy)
76268 (ECT-Electroconvulsive Therapy)
91398 (ECT-Electroconvulsive Therapy)
66909 (ECT-Electroconvulsive Therapy)
78831 (ECT-Electroconvulsive Therapy)
91926 (ECT-Electroconvulsive Therapy)

## 2023-11-24 NOTE — ECT AMBULATORY DISCHARGE PLAN - NSDCPNDISPN_GEN_ALL_CORE
Education provided on the pain management plan of care

## 2023-11-24 NOTE — ECT PRE-PROCEDURE CHECKLIST - TEMPERATURE IN CELSIUS (DEGREES C)
37.3
37.1
37.1
37.2
37.3
36.9
37.3
37.3
36.6
37.3
37.2
36.9
36.7
36.7
37.3
37.1
36.7
36.9
37.2
36.4
37.1

## 2023-11-24 NOTE — ECT AMBULATORY DISCHARGE PLAN - NSPOSTECTCALLBEFORE_PSY_ALL_CORE
[facility] ([facility]) scheduling office at [phone]
[facility] ([facility]) scheduling office at [phone]
[facility] ([facility]) scheduling office at [phone]
[facility] ([facility]) scheduling office at [phone]
[facility] ([facility]) scheduling office at [phone]
[facility] ([facility]) scheduling office at [phone]
[facility] ([facility]) scheduling office at [phone]
[facility] ([facility]) scheduling office at [phone]
[facility] ([facility]) scheduling office at [phone]
[facility] ([facility]) scheduling office at [phone]
[facility] ([facility]) scheduling office at [phone]
[facility] ([facility]) scheduling office at [phone]
[facility] ([facility]) scheduling office at [phone]
[facility] ([facility]) scheduling office at [phone]
[facility] ([facility]) scheduling office at [phone]
[facility] ([facility]) scheduling office at [phone]
[facility] ([facility]) scheduling office at [phone]
[facility] ([facility]) scheduling office at [phone]
[facility] ([facility]) scheduling office at [phone]

## 2023-11-24 NOTE — ECT TREATMENT NOTE - NSSUICPROTFACT_PSY_ALL_CORE
Responsibility to children, family, or others
Responsibility to children, family, or others/Supportive social network of family or friends/Engaged in work or school/Beloved pets
Responsibility to children, family, or others
Responsibility to children, family, or others/Supportive social network of family or friends/Engaged in work or school/Beloved pets
Responsibility to children, family, or others
Responsibility to children, family, or others/Supportive social network of family or friends
Responsibility to children, family, or others
Responsibility to children, family, or others/Supportive social network of family or friends
Responsibility to children, family, or others/Supportive social network of family or friends/Engaged in work or school/Beloved pets
Responsibility to children, family, or others
Responsibility to children, family, or others/Supportive social network of family or friends/Engaged in work or school/Beloved pets
Responsibility to children, family, or others/Supportive social network of family or friends
Responsibility to children, family, or others/Supportive social network of family or friends/Engaged in work or school/Beloved pets
Responsibility to children, family, or others
Responsibility to children, family, or others
Responsibility to children, family, or others/Supportive social network of family or friends/Engaged in work or school/Beloved pets
Responsibility to children, family, or others
Responsibility to children, family, or others/Supportive social network of family or friends/Engaged in work or school/Beloved pets
Responsibility to children, family, or others/Supportive social network of family or friends
Responsibility to children, family, or others/Supportive social network of family or friends
Responsibility to children, family, or others

## 2023-11-24 NOTE — ECT TREATMENT NOTE - NSECTCOMMENTS_PSY_ALL_CORE
Interval extended to 6 weeks due to a family issue, per patient. Patient stable through the entire treatment interval without recurrence of presenting symptoms. Mood, energy, sleep, and appetite were within normal limits. He is interested in discontinuing treatment due to anxiety around the treatment; recommended he discuss with Dr. Arrieta. We will continue maintenance ECT to help reduce the chances of relapse as per the current treatment plan.

## 2023-11-24 NOTE — ECT PRE-PROCEDURE CHECKLIST - NSADULTACCOMPNAME_PSY_ALL_CORE
father 
jovan golden
father 
jovan golden
jovan golden
Janel    mother
father 
jovan golden
Janel    mother
Janel    mother
jovan golden
Janel    mother
father 
Mother
father 
Janel    mother
jovan golden
Janel    mother
mother  
father 
father

## 2023-11-24 NOTE — ECT AMBULATORY DISCHARGE PLAN - POST OP PHONE #
[phone]
[phone]
[phone]
[phone]
[phone]
[phone]
[phone]
[phone]
[phone]
[phone]

## 2023-11-24 NOTE — ECT TREATMENT NOTE - NSICDXBHPRIMARYDX_PSY_ALL_CORE
Moderate major depression   F32.1  

## 2023-11-24 NOTE — ECT TREATMENT NOTE - NSECTCHANGEFREQ_PSY_ALL_CORE
No change
Decrease
No change
Decrease
No change
Decrease
Decrease
No change
No change

## 2023-11-24 NOTE — ECT TREATMENT NOTE - NSECTTXPERFDATETIME_PSY_ALL_CORE
03-Jul-2023 13:45
04-Aug-2023 14:36
07-Jul-2023 13:37
12-Jun-2023 15:51
06-Jun-2023 14:52
13-Oct-2023 13:40
30-May-2023 13:51
24-Nov-2023 13:54
28-Jul-2023 14:36
30-Jun-2023 14:03
23-Jun-2023 14:14
20-Jun-2023 13:45
22-Sep-2023 14:05
27-Jun-2023 13:26
21-Jul-2023 14:27
01-Jun-2023 13:32
08-Jun-2023 16:21
15-Sanchez-2023 17:41
08-Sep-2023 13:51
14-Jul-2023 15:15
17-Aug-2023 13:58

## 2023-11-24 NOTE — ECT AMBULATORY DISCHARGE PLAN - NSDCPEFALRISK_GEN_ALL_CORE
For information on Fall & Injury Prevention, visit: https://www.[facility]/news/fall-prevention-protects-and-maintains-health-and-mobility OR  https://www.[facility]/news/fall-prevention-tips-to-avoid-injury OR  https://www.cdc.gov/steadi/patient.html
For information on Fall & Injury Prevention, visit: https://www.[facility]/news/fall-prevention-protects-and-maintains-health-and-mobility OR  https://www.[facility]/news/fall-prevention-tips-to-avoid-injury OR  https://www.cdc.gov/steadi/patient.html
For information on Fall & Injury Prevention, visit: https://www.[facility]/news/fall-prevention-protects-and-maintains-health-and-mobility OR  https://www.[facility]/news/fall-prevention-tips-to-avoid-injury OR  https://www.cdc.gov/steadi/patient.html
For information on Fall & Injury Prevention, visit: https://www.[facility]/news/fall-prevention-protects-and-maintains-health-and-mobility OR  https://www.[facility]/news/fall-prevention-tips-to-avoid-injury OR  https://www.cdc.gov/steadi/patient.html
For information on Fall & Injury Prevention, visit: https://www.[facility]/news/fall-prevention-protects-and-maintains-health-and-mobility OR  https://www.[facility]/news/fall-prevention-tips-to-avoid-injury OR  https://www.cdc.gov/steadi/patient.html
For information on Fall & Injury Prevention, visit: https://www.[facility]/news/fall-prevention-protects-and-maintains-health-and-mobility OR  https://www.[facility]/news/fall-prevention-tips-to-avoid-injury OR  https://www.cdc.gov/steadi/patient.html
For information on Fall & Injury Prevention, visit: https://www.[facility]/news/fall-prevention-protects-and-maintains-health-and-mobility OR  https://www.[facility]/news/fall-prevention-tips-to-avoid-injury OR  https://www.cdc.gov/steadi/patient.html
For information on Fall & Injury Prevention, visit: https://www.[facility]/news/fall-prevention-protects-and-maintains-health-and-mobility OR  https://www.[facility]/news/fall-prevention-tips-to-avoid-injury OR  https://www.cdc.gov/steadi/patient.html
For information on Fall & Injury Prevention, visit: https://www.[facility]/news/fall-prevention-protects-and-maintains-health-and-mobility OR  https://www.[facility]/news/fall-prevention-tips-to-avoid-injury OR  https://www.cdc.gov/steadi/patient.html
For information on Fall & Injury Prevention, visit: https://www.[facility]/news/fall-prevention-protects-and-maintains-health-and-mobility OR  https://www.[facility]/news/fall-prevention-tips-to-avoid-injury OR  https://www.cdc.gov/steadi/patient.html
For information on Fall & Injury Prevention, visit: https://www.[facility]/news/fall-prevention-protects-and-maintains-health-and-mobility OR  https://www.[facility]/news/fall-prevention-tips-to-avoid-injury OR  https://www.cdc.gov/steadi/patient.html
For information on Fall & Injury Prevention, visit: https://www.[facility]/news/fall-prevention-protects-and-maintains-health-and-mobility OR  https://www.[facility]/news/fall-prevention-tips-to-avoid-injury OR  https://www.cdc.gov/steadi/patient.html
For information on Fall & Injury Prevention, visit: https://www.[facility]/news/fall-prevention-protects-and-maintains-health-and-mobility OR  https://www.[facility]/news/fall-prevention-tips-to-avoid-injury OR  https://www.cdc.gov/steadi/patient.html
For information on Fall & Injury Prevention, visit: https://www.[facility]/news/fall-prevention-protects-and-maintains-health-and-mobility OR  https://www.[facility]/news/fall-prevention-tips-to-avoid-injury OR  https://www.cdc.gov/steadi/patient.html
For information on Fall & Injury Prevention, visit: https://www.[facility]/news/fall-prevention-protects-and-maintains-health-and-mobility OR  https://www.[facility]/news/fall-prevention-tips-to-avoid-injury OR  https://www.cdc.gov/steadi/patient.html
For information on Fall & Injury Prevention, visit: https://www.[facility]/news/fall-prevention-protects-and-maintains-health-and-mobility OR  https://www.[facility]/news/fall-prevention-tips-to-avoid-injury OR  https://www.cdc.gov/steadi/patient.html
For information on Fall & Injury Prevention, visit: https://www.[facility]/news/fall-prevention-protects-and-maintains-health-and-mobility OR  https://www.[facility]/news/fall-prevention-tips-to-avoid-injury OR  https://www.cdc.gov/steadi/patient.html
For information on Fall & Injury Prevention, visit: https://www.[facility]/news/fall-prevention-protects-and-maintains-health-and-mobility OR  https://www.[facility]/news/fall-prevention-tips-to-avoid-injury OR  https://www.cdc.gov/steadi/patient.html
For information on Fall & Injury Prevention, visit: https://www.[facility]/news/fall-prevention-protects-and-maintains-health-and-mobility OR  https://www.[facility]/news/fall-prevention-tips-to-avoid-injury OR  https://www.cdc.gov/steadi/patient.html
For information on Fall & Injury Prevention, visit: https://www.[facility]/news/fall-prevention-protects-and-maintains-health-and-mobility OR  https://www.[facility]/news/fall-prevention-tips-to-avoid-injury OR  https://www.cdc.gov/steadi/patient.html
For information on Fall & Injury Prevention, visit: https://www.[facility]/news/fall-prevention-protects-and-maintains-health-and-mobility OR  https://www.[facility]/news/fall-prevention-tips-to-avoid-injury OR  https://www.cdc.gov/steadi/patient.html

## 2023-11-24 NOTE — ECT PRE-PROCEDURE CHECKLIST - NSPROEDALEARNPREFOTH_GEN_A_NUR
verbal instruction/written material

## 2023-11-24 NOTE — ECT PRE-PROCEDURE CHECKLIST - NSMENTALSTATUS_PSY_ALL_CORE
anxious
calm/anxious
calm/anxious
anxious
anxious
calm/anxious
calm/anxious
anxious
calm/anxious
anxious
calm/anxious
calm/anxious
anxious
calm/anxious
anxious
calm/anxious

## 2023-11-24 NOTE — ECT PRE-PROCEDURE CHECKLIST - AS BP NONINV SITE
left upper arm
right upper arm
left upper arm
left lower arm
left upper arm
right upper arm

## 2023-11-24 NOTE — ECT PRE-PROCEDURE CHECKLIST - NSMEDSDOSETAKEN_PSY_ALL_CORE
No medication 
no meds today
No medication 
Wellbutrin/lexapro
no meds today
none
No medication 
no meds today
no meds today
No medication 
none
Wellbutrin
no meds today
Wellbutrin
No medication 
Wellbutrin
none
lexapro/wellbutrin
Wellbutrin
Wellbutrin
No medication

## 2023-11-24 NOTE — ECT TREATMENT NOTE - NSECTROSNEGAT_PSY_ALL_CORE
Review of Systems negative/unchanged from previous exam except as noted below

## 2023-11-24 NOTE — ECT PRE-PROCEDURE CHECKLIST - INV PERIPH IV DEVICE
Angiocath

## 2023-11-24 NOTE — ECT TREATMENT NOTE - NSICDXBHSECONDARYDX_PSY_ALL_CORE
Personality disorder   F60.9  

## 2023-11-24 NOTE — ECT PRE-PROCEDURE CHECKLIST - NSPROEDAREADYLEARN_GEN_A_NUR
acuteness of illness/lack of motivation
lack of motivation
lack of motivation
acuteness of illness/lack of motivation
lack of motivation
acuteness of illness/lack of motivation
lack of motivation

## 2023-11-24 NOTE — ECT AMBULATORY DISCHARGE PLAN - NSDPACMPNY_GEN_ALL_CORE
Parents
Family
Family
Parents

## 2023-11-24 NOTE — ECT PRE-PROCEDURE CHECKLIST - CAREGIVER ADDRESS
151,  15 - 28 [facility] , [facility] [address]
151,  15 - 28 [facility] , [facility] [address]
151,  15 - 28 [facility] , [facility] [address]
151,  15 - 28 [facility] , [facility] [address]
151,  15 - 28 [facility] , [facility] [address]
151,  15 - 28 [facility] , [facility] [address]
151,  15 - 28 [facility] , [facility] [address]
151,  15 - 28 [facility] , [facility] [address]
151,  15 - 28 [facility] , [facility] [address]
151,  15 - 28 [facility] , [facility] [address]
151,  15 - 28 [facility] , [facility] [address]
151,  15 - 28 [facility] , [facility] [address]
151,  15 - 28 [facility] , [facility] [address]
151,  15 - 28 [facility] , [facility] [address]
151,  15 - 28 [facility] , [facility] [address]
151,  15 - 28 [facility] , [facility] [address]
151,  15 - 28 [facility] , [facility] [address]
151,  15 - 28 [facility] , [facility] [address]
151,  15 - 28 [facility] , [facility] [address]
151,  15 - 28 [facility] , [facility] [address]
151,  15 - 28 [facility] , [facility] [address]

## 2023-11-24 NOTE — ECT PRE-PROCEDURE CHECKLIST - NSADULTACCOMPRELATION_PSY_ALL_CORE
parent(s)

## 2023-11-24 NOTE — ECT PRE-PROCEDURE CHECKLIST - PATIENT'S PREFERRED PRONOUN
Him/He

## 2023-11-24 NOTE — ECT PRE-PROCEDURE CHECKLIST - NSPROEDALEARNER_GEN_A_NUR
family
mother
family
mother
family
family
mother
mother
family
mother
family

## 2023-11-24 NOTE — ECT PRE-PROCEDURE CHECKLIST - NSDISPOFBELONG_PSY_ALL_CORE
not applicable
remains with patient
not applicable
remains with patient
not applicable
remains with patient
not applicable

## 2023-11-24 NOTE — ECT AMBULATORY DISCHARGE PLAN - NSPOSTECTCALLZHH_PSY_ALL_CORE
Call the [facility] ECT suite at [phone]
Call the [facility] ECT suite at [phone]
Call the [facility] ECT suite at [phone]
Call the [facility] ECT suite at [phone]
Call the [facility] ECT suite at [phone]
Call the [facility] ECT suite at [phone]
Call the [facility] ECT suite at [phone]
Call the [facility] ECT suite at [phone]
Call the [facility] ECT suite at [phone]
Call the [facility] ECT suite at [phone]
Call the [facility] ECT suite at [phone]
Call the [facility] ECT suite at [phone]
Call the [facility] ECT suite at [phone]
Call the [facility] ECT suite at [phone]
Call the [facility] ECT suite at [phone]
Call the [facility] ECT suite at [phone]
Call the [facility] ECT suite at [phone]
Call the [facility] ECT suite at [phone]
Call the [facility] ECT suite at [phone]
Call the [facility] ECT suite at [phone]
Call the [facility] ECT suite at [phone]

## 2023-11-24 NOTE — ECT PRE-PROCEDURE CHECKLIST - INV PERIPH IV SIZE
22 gauge
22 gauge/1.0 in length
22 gauge

## 2023-11-24 NOTE — ECT AMBULATORY DISCHARGE PLAN - NSPROEXTENSOFSELF_PSY_ALL_CORE
none
contact lenses
none
contact lenses
none
contact lenses
none
none
contact lenses
none

## 2023-11-24 NOTE — ECT AMBULATORY DISCHARGE PLAN - NS TRANSFER DISPOSITION PATIENT BELONGINGS
not applicable
with patient
not applicable
with patient
with patient
not applicable

## 2023-11-24 NOTE — ECT PRE-PROCEDURE CHECKLIST - HOW PATIENT ADDRESSED, PROFILE
Ramon
aRmon
Ramon

## 2023-11-24 NOTE — ECT PRE-PROCEDURE CHECKLIST - NSPROEDALEARNPREF_GEN_A_NUR
verbal instruction/video/written material

## 2023-11-24 NOTE — ECT AMBULATORY DISCHARGE PLAN - NSPOSTECTFUPHCALL_PSY_ALL_CORE
You will receive a follow-up phone call from a nurse by the next business day after your treatment to ask how you are feeling.

## 2023-11-24 NOTE — ECT PRE-PROCEDURE CHECKLIST - ALLERGIES
Allergies:-  Allergy Status Unknown      

## 2023-11-24 NOTE — ECT PRE-PROCEDURE CHECKLIST - HAND OFF
Holding RN to OR RN

## 2023-11-24 NOTE — ECT AMBULATORY DISCHARGE PLAN - PATIENT PORTAL LINK FT
You can access the FollowMyHealth Patient Portal offered by [facility] by registering at the following website: [email or web address]. By joining [employer]’s FollowMyHealth portal, you will also be able to view your health information using other applications (apps) compatible with our system.
You can access the FollowMyHealth Patient Portal offered by [facility] by registering at the following website: [email or web address]. By joining [employer]’s FollowMyHealth portal, you will also be able to view your health information using other applications (apps) compatible with our system.
You can access the FollowMyHealth Patient Portal offered by [facility] by registering at the following website: [email or web address]. By joining [employer]’s FollowMyHealth portal, you will also be able to view your health information using other applications (apps) compatible with our system.
You can access the FollowMyHealth Patient Portal offered by [facility] by registering at the following website: [email or web address]. By joining [employer]’s FollowMyHealth portal, you will also be able to view your health information using other applications (apps) compatible with our system.
You can access the FollowMyHealth Patient Portal offered by [facility] by registering at the following website: [email or web address]. By joining [employer]’s FollowMyHealth portal, you will also be able to view your health information using other applications (apps) compatible with our system.
You can access the FollowMyHealth Patient Portal offered by [facility] by registering at the following website: [email or web address]. By joining [employer]’s FollowMyHealth portal, you will also be able to view your health information using other applications (apps) compatible with our system.
You can access the FollowMyHealth Patient Portal offered by [facility] by registering at the following website: [email or web address]. By joining [employer]’s FollowMyHealth portal, you will also be able to view your health information using other applications (apps) compatible with our system.
You can access the FollowMyHealth Patient Portal offered by [facility] by registering at the following website: [email or web address]. By joining [employer]’s FollowMyHealth portal, you will also be able to view your health information using other applications (apps) compatible with our system.
You can access the FollowMyHealth Patient Portal offered by [facility] by registering at the following website: [email or web address]. By joining [employer]’s FollowMyHealth portal, you will also be able to view your health information using other applications (apps) compatible with our system.
You can access the FollowMyHealth Patient Portal offered by [facility] by registering at the following website: [email or web address]. By joining [employer]’s FollowMyHealth portal, you will also be able to view your health information using other applications (apps) compatible with our system.
You can access the FollowMyHealth Patient Portal offered by [facility] by registering at the following website: [email or web address]. By joining [employer]’s FollowMyHealth portal, you will also be able to view your health information using other applications (apps) compatible with our system.
You can access the FollowMyHealth Patient Portal offered by [facility] by registering at the following website: [email or web address]. By joining [employer]’s FollowMyHealth portal, you will also be able to view your health information using other applications (apps) compatible with our system.
You can access the FollowMyHealth Patient Portal offered by [facility] by registering at the following website: [email or web address]. By joining [employer]’s FollowMyHealth portal, you will also be able to view your health information using other applications (apps) compatible with our system.
You can access the FollowMyHealth Patient Portal offered by [facility] by registering at the following website: [email or web address]. By joining [employer]’s FollowMyHealth portal, you will also be able to view your health information using other applications (apps) compatible with our system.
You can access the FollowMyHealth Patient Portal offered by [facility] by registering at the following website: [email or web address]. By joining [employer]’s FollowMyHealth portal, you will also be able to view your health information using other applications (apps) compatible with our system.
You can access the FollowMyHealth Patient Portal offered by [facility] by registering at the following website: [email or web address]. By joining [employer] [facility]’s FollowMyHealth portal, you will also be able to view your health information using other applications (apps) compatible with our system.
You can access the FollowMyHealth Patient Portal offered by [facility] by registering at the following website: [email or web address]. By joining [employer]’s FollowMyHealth portal, you will also be able to view your health information using other applications (apps) compatible with our system.
You can access the FollowMyHealth Patient Portal offered by [facility] by registering at the following website: [email or web address]. By joining [employer]’s FollowMyHealth portal, you will also be able to view your health information using other applications (apps) compatible with our system.
You can access the FollowMyHealth Patient Portal offered by [facility] by registering at the following website: [email or web address]. By joining [employer]’s FollowMyHealth portal, you will also be able to view your health information using other applications (apps) compatible with our system.
You can access the FollowMyHealth Patient Portal offered by [facility] by registering at the following website: [email or web address]. By joining [employer]’s FollowMyHealth portal, you will also be able to view your health information using other applications (apps) compatible with our system.
You can access the FollowMyHealth Patient Portal offered by [facility] by registering at the following website: [email or web address]. By joining [employer]’s FollowMyHealth portal, you will also be able to view your health information using other applications (apps) compatible with our system.

## 2023-11-24 NOTE — ECT PRE-PROCEDURE CHECKLIST - CAREGIVER NAME
Ramon Pollock
Ramon Pollokc
Ramon Pollock

## 2023-11-24 NOTE — ECT AMBULATORY DISCHARGE PLAN - NSPOSTECTPROVEDUCFT_PSY_ALL_CORE
Covid Instructions
ect teaching  covid teaching
ECT  instruction. 
ect teaching  covid teaching
Discharge instructions Reinforced
Post ECT instructions
ECT Instructions Reinforced
ECT  instruction. 
Post ECT instructions
ect teaching  covid teaching
written and verbal discharge instructions
Covid teaching and D/C instructions 
ect teaching  covid teaching
ect teaching
ECT Instructions Reinforced
discharge instructions
ECT EDUCATION
post ECT discharge instructions, covid education 
Covid teaching and D/C instructions 
verbal and written discharge instructions
Covid teaching and D/C instructions

## 2023-11-24 NOTE — ECT PRE-PROCEDURE CHECKLIST - PERIPHERAL IV: INSERTION DATE
03-Jul-2023
14-Jul-2023
17-Aug-2023
13-Oct-2023
23-Jun-2023
28-Jul-2023
08-Sep-2023
22-Sep-2023
01-Jun-2023
30-May-2023
06-Jun-2023
20-Jun-2023
30-Jun-2023
15-Sanchez-2023
07-Jul-2023
08-Jun-2023
27-Jun-2023
24-Nov-2023
04-Aug-2023
21-Jul-2023
12-Jun-2023

## 2023-11-24 NOTE — ECT PRE-PROCEDURE CHECKLIST - NSECTNPODT_PSY_ALL_CORE
04-Aug-2023 07:30
06-Jul-2023 20:00
07-Jun-2023 20:00
22-Sep-2023 00:00
20-Jul-2023 22:00
12-Oct-2023 19:00
07-Sep-2023 23:00
13-Jul-2023 20:00
16-Aug-2023 11:00
29-May-2023 18:00
03-Jul-2023 00:00
22-Jun-2023 23:00
26-Jun-2023 23:00
20-Jun-2023 08:00
01-Jun-2023 06:00
12-Jun-2023 08:00
28-Jul-2023 00:00
23-Nov-2023 21:00
05-Jun-2023 21:00
15-Sanchez-2023 13:00
30-Jun-2023 19:00

## 2023-11-24 NOTE — ECT PRE-PROCEDURE CHECKLIST - TO WHOM
procedural nurse 

## 2023-11-24 NOTE — ECT PRE-PROCEDURE CHECKLIST - AS BP NONINV METHOD
electronic

## 2023-11-27 ENCOUNTER — APPOINTMENT (OUTPATIENT)
Dept: PSYCHIATRY | Facility: CLINIC | Age: 28
End: 2023-11-27
Payer: COMMERCIAL

## 2023-11-27 PROCEDURE — 90837 PSYTX W PT 60 MINUTES: CPT

## 2023-12-04 ENCOUNTER — APPOINTMENT (OUTPATIENT)
Dept: PSYCHIATRY | Facility: CLINIC | Age: 28
End: 2023-12-04
Payer: COMMERCIAL

## 2023-12-04 PROCEDURE — 90837 PSYTX W PT 60 MINUTES: CPT

## 2023-12-11 ENCOUNTER — APPOINTMENT (OUTPATIENT)
Dept: PSYCHIATRY | Facility: CLINIC | Age: 28
End: 2023-12-11
Payer: COMMERCIAL

## 2023-12-11 PROCEDURE — 99214 OFFICE O/P EST MOD 30 MIN: CPT

## 2023-12-11 PROCEDURE — 90837 PSYTX W PT 60 MINUTES: CPT

## 2023-12-13 NOTE — PLAN
[Recommended Frequency of Visits: ____] : Recommended frequency of visits: [unfilled] [Return in ____ week(s)] : Return in [unfilled] week(s) [Cognitive and/or Behavior Therapy] : Cognitive and/or Behavior Therapy  [Psychoeducation] : Psychoeducation  [Supportive Therapy] : Supportive Therapy [FreeTextEntry2] : Increased understanding of anxious feelings\par  Learn relaxation techniques to reduce anxiety (thought stopping, thought switching, creative visualization, progressive muscle relaxation, deep breathing)\par  Learn about adverse childhood experiences and healing childhood attachment injuries (IFS)\par  Engage in a productive activity \par  Follow Safety Plan  [de-identified] : Pt. was engaged in session; affect and mood WNL. This session pt. and SW discussed and explored his desire to discontinue his ECT treatment. Pt. reports he feels he has gained enough benefit from the treatment thus far and will share his feelings with Dr. Arrieta. Pt. reports he is no longer thinking about suicide, but rather is somewhat optimistic and encouraged about his future. Pt. acknowledged that he has fears and worries about the state of the world in general, but that is not something he can control. SW empathized with pt. and worked with him on getting in touch with his needs and worries.  [FreeTextEntry1] : Meet for weekly individual counseling, level of care is appropriate. Pt. will refer to safety plan.

## 2023-12-13 NOTE — REASON FOR VISIT
[Patient preference] : as per patient preference [Starting, patient seen in-person within last 6 months] : Telehealth services are being started as patient has seen in-person within last 6 months. [Telehealth (audio & video) - Individual/Group] : This visit was provided via telehealth using real-time 2-way audio visual technology. [Other Location: e.g. Home (Enter Location, City,State)___] : The provider was located at [unfilled]. [Home] : The patient, [unfilled], was located at home, [unfilled], at the time of the visit. [Verbal consent obtained from patient/other participant(s)] : Verbal consent for telehealth/telephonic services obtained from patient/other participant(s) [Patient] : Patient [FreeTextEntry4] : 3:00 [FreeTextEntry5] : 4:00

## 2023-12-18 ENCOUNTER — APPOINTMENT (OUTPATIENT)
Dept: PSYCHIATRY | Facility: CLINIC | Age: 28
End: 2023-12-18
Payer: COMMERCIAL

## 2023-12-18 PROCEDURE — 90837 PSYTX W PT 60 MINUTES: CPT

## 2023-12-21 NOTE — PLAN
[Skills training (all types)] : Skills training (all types)  [Supportive Therapy] : Supportive Therapy [Other: ____] : [unfilled] [Recommended Frequency of Visits: ____] : Recommended frequency of visits: [unfilled] [Return in ____ week(s)] : Return in [unfilled] week(s) [FreeTextEntry2] : Increased understanding of anxious feelings\par  Learn relaxation techniques to reduce anxiety (thought stopping, thought switching, creative visualization, progressive muscle relaxation, deep breathing)\par  Learn about adverse childhood experiences and healing childhood attachment injuries (IFS)\par  Engage in a productive activity \par  Follow Safety Plan  [de-identified] : Pt. was engaged in session; affect and mood WNL. This session pt. and SW discussed and explored the part of him that procrastinates, and pushes off his responsibilities to the last minute. Pt. spoke about this pattern of behavior, and verbalized what this looks like in his day to day life. Pt. reports that he does not intend to procrastinate and delay his responsibilities, but finds that he does not prioritize tasks in a manner that allows him to complete them in a timely manner. Pt. acknowledged that this has been a part of his life since he was a young student, and finds it frustrating that he has not found a way to overcome this challenge. SW empathized with pt. and helped him get in touch with this part via IFS.  [FreeTextEntry1] : Meet for weekly individual counseling, level of care is appropriate. Pt. will refer to safety plan.

## 2024-01-08 ENCOUNTER — APPOINTMENT (OUTPATIENT)
Dept: PSYCHIATRY | Facility: CLINIC | Age: 29
End: 2024-01-08
Payer: COMMERCIAL

## 2024-01-08 PROCEDURE — 90837 PSYTX W PT 60 MINUTES: CPT

## 2024-01-11 NOTE — REASON FOR VISIT
[Patient preference] : as per patient preference [Starting, patient seen in-person within last 6 months] : Telehealth services are being started as patient has seen in-person within last 6 months. [Telehealth (audio & video) - Individual/Group] : This visit was provided via telehealth using real-time 2-way audio visual technology. [Other Location: e.g. Home (Enter Location, City,State)___] : The provider was located at [unfilled]. [Home] : The patient, [unfilled], was located at home, [unfilled], at the time of the visit. [Verbal consent obtained from patient/other participant(s)] : Verbal consent for telehealth/telephonic services obtained from patient/other participant(s) [FreeTextEntry4] : 3:00 [FreeTextEntry5] : 4:00 [Patient] : Patient

## 2024-01-11 NOTE — PLAN
[FreeTextEntry2] : Increased understanding of anxious feelings\par  Learn relaxation techniques to reduce anxiety (thought stopping, thought switching, creative visualization, progressive muscle relaxation, deep breathing)\par  Learn about adverse childhood experiences and healing childhood attachment injuries (IFS)\par  Engage in a productive activity \par  Follow Safety Plan  [Psychoeducation] : Psychoeducation  [Skills training (all types)] : Skills training (all types)  [Supportive Therapy] : Supportive Therapy [de-identified] : Pt. was engaged in session; affect and mood WNL. This session pt. and SW discussed and explored social skills, and ways of starting conversations. Pt. spoke about his interest in meeting others, and building relationships. However, pt. reports he has never been confident and skilled in socializing with people he does not already have a relationship with. Pt. reports that a part of him is worried that if he approaches a woman, they will mistake him for being inappropriate, and that will have negative consequences for him. SW empathized with pt. and worked with him on identifying assertiveness skills.  [Recommended Frequency of Visits: ____] : Recommended frequency of visits: [unfilled] [Return in ____ week(s)] : Return in [unfilled] week(s) [FreeTextEntry1] : Meet for weekly individual counseling, level of care is appropriate. Pt. will refer to safety plan.

## 2024-01-17 ENCOUNTER — APPOINTMENT (OUTPATIENT)
Dept: PSYCHIATRY | Facility: CLINIC | Age: 29
End: 2024-01-17

## 2024-01-24 ENCOUNTER — APPOINTMENT (OUTPATIENT)
Dept: PSYCHIATRY | Facility: CLINIC | Age: 29
End: 2024-01-24
Payer: COMMERCIAL

## 2024-01-24 PROCEDURE — 90837 PSYTX W PT 60 MINUTES: CPT

## 2024-01-26 NOTE — PHYSICAL EXAM
[Cooperative] : cooperative [Depressed] : depressed [Clear] : clear [Flat] : flat [Linear/Goal Directed] : linear/goal directed [None Reported] : none reported [Depressive] : depressive [Average] : average [WNL] : within normal limits

## 2024-01-26 NOTE — PLAN
[FreeTextEntry2] : Increased understanding of anxious feelings\par  Learn relaxation techniques to reduce anxiety (thought stopping, thought switching, creative visualization, progressive muscle relaxation, deep breathing)\par  Learn about adverse childhood experiences and healing childhood attachment injuries (IFS)\par  Engage in a productive activity \par  Follow Safety Plan  [Skills training (all types)] : Skills training (all types)  [Supportive Therapy] : Supportive Therapy [Other: ____] : [unfilled] [de-identified] : Pt. was engaged in session; affect and mood WNL. This session focused on patients progress toward his educational goals, as well as building his self esteem. 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 Pt. reports his mood continues to be stable, and he is enjoying his classes, as well as time with his friends and family. Pt. shared that he no longer has suicidal thoughts, and rather contemplates more on life and finding his place in society. SW empathized with pt. and worked with him on identifying his values and ideals.  [Recommended Frequency of Visits: ____] : Recommended frequency of visits: [unfilled] [Return in ____ week(s)] : Return in [unfilled] week(s) [FreeTextEntry1] : Meet for weekly individual counseling, level of care is appropriate. Pt. will refer to safety plan.

## 2024-01-31 ENCOUNTER — APPOINTMENT (OUTPATIENT)
Dept: PSYCHIATRY | Facility: CLINIC | Age: 29
End: 2024-01-31
Payer: COMMERCIAL

## 2024-01-31 PROCEDURE — 90837 PSYTX W PT 60 MINUTES: CPT

## 2024-02-02 NOTE — PLAN
[FreeTextEntry2] : Increased understanding of anxious feelings\par  Learn relaxation techniques to reduce anxiety (thought stopping, thought switching, creative visualization, progressive muscle relaxation, deep breathing)\par  Learn about adverse childhood experiences and healing childhood attachment injuries (IFS)\par  Engage in a productive activity \par  Follow Safety Plan  [[place] [facility]] : [facility]  [Skills training (all types)] : Skills training (all types)  [Supportive Therapy] : Supportive Therapy [de-identified] : Pt. was engaged in session; affect and mood WNL. This session pt. and SW discussed and explored coping with environmental stressors. Pt. spoke about current events in world news, and the chaos that he sees in society at this time. Pt. reports that he finds it upsetting that there is so much dysfunction and conflict in the world at this time, and does not know how much worse things will become. Pt. acknowledged that he continues to take college courses, and think about his future. Pt. reports he hopes his efforts to take care of himself will pay off in the future.  SW empathized with pt. and worked with him in coping with stress and worry.  [Recommended Frequency of Visits: ____] : Recommended frequency of visits: [unfilled] [Return in ____ week(s)] : Return in [unfilled] week(s) [FreeTextEntry1] : Meet for weekly individual counseling, level of care is appropriate. Pt. will refer to safety plan.

## 2024-02-07 ENCOUNTER — APPOINTMENT (OUTPATIENT)
Dept: PSYCHIATRY | Facility: CLINIC | Age: 29
End: 2024-02-07
Payer: COMMERCIAL

## 2024-02-07 PROCEDURE — 90837 PSYTX W PT 60 MINUTES: CPT

## 2024-02-09 NOTE — PLAN
[FreeTextEntry2] : Increased understanding of anxious feelings\par  Learn relaxation techniques to reduce anxiety (thought stopping, thought switching, creative visualization, progressive muscle relaxation, deep breathing)\par  Learn about adverse childhood experiences and healing childhood attachment injuries (IFS)\par  Engage in a productive activity \par  Follow Safety Plan  [Cognitive and/or Behavior Therapy] : Cognitive and/or Behavior Therapy  [Skills training (all types)] : Skills training (all types)  [Supportive Therapy] : Supportive Therapy [de-identified] : Pt. was engaged in session; affect and mood WNL. This session pt. and SW discussed and explored his college courses and how he is coping with his courseload. Pt. reports he is enjoying his classes, as well as the routine of having a place to be. Pt. also spoke about the state of society at this time, and his concern that people are not listening to one another, and rather are looking to disagree with them. Pt. shared that as time progresses, he is caring less about the things he cannot control, and more about the things he can, including school. and his health. SW empathized with pt. and worked with him on reinforcing his academic and social progress.  [Recommended Frequency of Visits: ____] : Recommended frequency of visits: [unfilled] [Return in ____ week(s)] : Return in [unfilled] week(s) [FreeTextEntry1] : Meet for weekly individual counseling, level of care is appropriate. Pt. will refer to safety plan.

## 2024-02-14 ENCOUNTER — APPOINTMENT (OUTPATIENT)
Dept: PSYCHIATRY | Facility: CLINIC | Age: 29
End: 2024-02-14
Payer: COMMERCIAL

## 2024-02-14 ENCOUNTER — NON-APPOINTMENT (OUTPATIENT)
Age: 29
End: 2024-02-14

## 2024-02-14 PROCEDURE — 90837 PSYTX W PT 60 MINUTES: CPT

## 2024-02-14 PROCEDURE — 99214 OFFICE O/P EST MOD 30 MIN: CPT

## 2024-02-15 NOTE — SOCIAL HISTORY
[FreeTextEntry1] : Patient grew up in [place]. He attended [employer]. He graduated in 2009. He was an honor student. He participated no activities or sports. He still has 3 friends from [employer] school that he sees on a regular basis. The patient then went to [employer] graduating in 2013. His grades were in the 90s. He participated no activities and had no friends. Patient currently is student at [employer] in [place]. He has a 3.2 grade point average in biochemistry. He has no friends. The patient plays video games. He currently has a purple belt and Chinese martial arts.

## 2024-02-15 NOTE — PAST MEDICAL HISTORY
[FreeTextEntry1] : At the beginning of college the patient went to the [employer] [facility] [place]. Prior to that while at [employer] he felt isolative upset and kept to himself. Someone asked him what was going on he threatened to kill them. Because of that he was suspended. He had to see a therapist until he could go back to school. At [employer] he felt stressed out depressed and couldn't function. He felt overwhelmed and thought that everybody around him was better. The patient had some passive thoughts of self-harm wishing he would just disappear. Patient was started on Zoloft was titrated up to 200 mg. His primary care physician is now titrating it down and the patient is 100 mg.

## 2024-02-15 NOTE — FAMILY HISTORY
[FreeTextEntry1] : Patient was born in [place]. Mother 52 is a cartographer. Father 55 is a . He has no brothers or sisters. His maternal cousin who had mood swings and her maternal grandmother who was bipolar. There is no other psychiatric alcohol or drug history in the family there is no abuse history growing up.

## 2024-02-15 NOTE — HISTORY OF PRESENT ILLNESS
[de-identified] : Patient is here in the office for face-to-face interview with writer for 3 month follow-up visit.  Last seen in Sept 2023. No medication changes at that time. States last ECT was Black Friday (Nov 2023).   Mood: states he is busy with school. Taking chemistry class. States he feels depressed, guilty and frustrated about past and feels he is not meeting expectations of his family.  Sleep: 7-8 hours per night. No more waking up in the middle of the night.   Appetite: is good Energy: low at times. States especially in classes he is falling asleep. Only happens during class.  Concentration and motivation all could be better.  Denies any AVH, SI or HI.

## 2024-02-15 NOTE — PLAN
[FreeTextEntry2] : Increased understanding of anxious feelings\par  Learn relaxation techniques to reduce anxiety (thought stopping, thought switching, creative visualization, progressive muscle relaxation, deep breathing)\par  Learn about adverse childhood experiences and healing childhood attachment injuries (IFS)\par  Engage in a productive activity \par  Follow Safety Plan  [Cognitive and/or Behavior Therapy] : Cognitive and/or Behavior Therapy  [Skills training (all types)] : Skills training (all types)  [Supportive Therapy] : Supportive Therapy [de-identified] : Pt. was engaged in session; affect and mood WNL. This session pt. and SW discussed and explored how he is adjusting to this semester in college. Pt. cited of an incident in which he misunderstood an assignment, and experienced a "spiral." Pt. reports he felt guilt and shame that he did not properly understand the directions for his assignment, and was very critical of himself, privately. Pt. reports he believes his reaction was noticeable to others, as he became mute for several minutes before he was able to respond to his professor. Pt. reports did not like how he responded to this incident, as he now realizes it was "no big deal." Pt. acknowledged that in the moment, this mistake felt like confirmation of his inability to "do well in school." SW empathized with pt. and processed his reaction and self talk with him.  [Recommended Frequency of Visits: ____] : Recommended frequency of visits: [unfilled] [Return in ____ week(s)] : Return in [unfilled] week(s) [FreeTextEntry1] : Meet for weekly individual counseling, level of care is appropriate. Pt. will refer to safety plan.

## 2024-02-15 NOTE — DISCUSSION/SUMMARY
[FreeTextEntry1] : Assessment: Patient is a 26-year-old male with h/o anxiety and depression seen today for medication management. Patient is compliant with his medications, tolerating them well without any side effects. I-STOP was checked without any issues.   PLAN: D/Cwith ECT biweekly. Increase Wellbutrin 300 to 450 mg PO QAM for depression, low motivation, concentration energy, and SSRI induced sexual dysfunction Continue Lexapro 20 mg PO QD for depression and anxiety. - Discussed risks and benefits of medications including side effects of GI and sexual side effects with SSRI. Alternative strategies including no intervention discussed with patient. Patient consents to current medications as prescribed - Patient understands to contact clinic prn with concerns and agrees to call 911 or go to nearest ER if symptoms worsen. - Next appointment made in 1 months. Patient left the office without any distress

## 2024-02-28 ENCOUNTER — APPOINTMENT (OUTPATIENT)
Dept: PSYCHIATRY | Facility: CLINIC | Age: 29
End: 2024-02-28
Payer: COMMERCIAL

## 2024-02-28 PROCEDURE — 90837 PSYTX W PT 60 MINUTES: CPT

## 2024-02-29 NOTE — RISK ASSESSMENT
[No, patient denies ideation or behavior] : No, patient denies ideation or behavior [Depressed mood/Anhedonia] : depressed mood/anhedonia [Hopelessness or despair] : hopelessness or despair [Triggering events leading to humiliation, shame, and/or despair] : triggering events leading to humiliation, shame, and/or despair (e.g. loss of relationship, financial or health status) (real or anticipated) [Supportive social network of family or friends] : supportive social network of family or friends [Positive therapeutic relationships] : positive therapeutic relationships [Frustration tolerance] : frustration tolerance [Moderate acute suicide risk] : Moderate acute suicide risk [Yes] : Safety Plan completed/updated (for individuals at risk): Yes [No] : No [FreeTextEntry8] : Safety Plan reviewed [FreeTextEntry9] : Pt. reports he will follow safety plan.

## 2024-02-29 NOTE — PLAN
[[facility]] : [facility]  [Skills training (all types)] : Skills training (all types)  [Supportive Therapy] : Supportive Therapy [Recommended Frequency of Visits: ____] : Recommended frequency of visits: [unfilled] [Return in ____ week(s)] : Return in [unfilled] week(s) [FreeTextEntry2] : Increased understanding of anxious feelings\par  Learn relaxation techniques to reduce anxiety (thought stopping, thought switching, creative visualization, progressive muscle relaxation, deep breathing)\par  Learn about adverse childhood experiences and healing childhood attachment injuries (IFS)\par  Engage in a productive activity \par  Follow Safety Plan  [FreeTextEntry1] : Meet for weekly individual counseling, level of care is appropriate. Pt. will refer to safety plan.  [de-identified] : Pt. was engaged in session; affect and mood WNL. This session pt. and SW discussed and explored current events, and how he is coping with news around the world. Pt. reports he feels concerned and worried about the state of affairs around the country and the world, and is uncertain about the future. Pt. shared that he does go on his phone a lot and visits different websites that have different news stories. Pt. acknowledged that he is trying to focus on his own path and goals, but part of him feels hopeless that the economy and other parts of society will not work towards his advantage. SW empathized with pt. and worked with him in coping with uncertainty and hopelessness.

## 2024-03-06 ENCOUNTER — APPOINTMENT (OUTPATIENT)
Dept: PSYCHIATRY | Facility: CLINIC | Age: 29
End: 2024-03-06

## 2024-03-13 ENCOUNTER — APPOINTMENT (OUTPATIENT)
Dept: PSYCHIATRY | Facility: CLINIC | Age: 29
End: 2024-03-13
Payer: COMMERCIAL

## 2024-03-13 PROCEDURE — 99214 OFFICE O/P EST MOD 30 MIN: CPT

## 2024-03-13 PROCEDURE — 90837 PSYTX W PT 60 MINUTES: CPT

## 2024-03-13 NOTE — SOCIAL HISTORY
[FreeTextEntry1] : Patient grew up in [place]. He attended [employer]. He graduated in 2009. He was an honor student. He participated no activities or sports. He still has 3 friends from [employer] school that he sees on a regular basis. The patient then went to [employer] school graduating in 2013. His grades were in the 90s. He participated no activities and had no friends. Patient currently is student at [employer] in [place]. He has a 3.2 grade point average in biochemistry. He has no friends. The patient plays video games. He currently has a purple belt and Chinese martial arts.

## 2024-03-13 NOTE — DISCUSSION/SUMMARY
[FreeTextEntry1] : Assessment: Patient is a 26-year-old male with h/o anxiety and depression seen today for medication management. Patient is compliant with his medications, tolerating them well without any side effects. I-STOP was checked without any issues.   PLAN: D/Cwith ECT biweekly. Decrease Wellbutrin 450 to 300 mg PO QAM for depression, low motivation, concentration energy, and SSRI induced sexual dysfunction Continue Lexapro 20 mg PO QD for depression and anxiety. - Discussed risks and benefits of medications including side effects of GI and sexual side effects with SSRI. Alternative strategies including no intervention discussed with patient. Patient consents to current medications as prescribed - Patient understands to contact clinic prn with concerns and agrees to call 911 or go to nearest ER if symptoms worsen. - Next appointment made in 1 months. Patient left the office without any distress

## 2024-03-13 NOTE — SOCIAL HISTORY
[FreeTextEntry1] : Patient grew up in [place]. He attended [employer]. He graduated in 2009. He was an honor student. He participated no activities or sports. He still has 3 friends from [employer] school that he sees on a regular basis. The patient then went to [employer] school graduating in 2013. His grades were in the 90s. He participated no activities and had no friends. Patient currently is student at [employer] in [place]. He has a 3.2 grade point average in biochemistry. He has no friends. The patient plays video games. He currently has a purple belt and Chinese martial arts.

## 2024-03-13 NOTE — PAST MEDICAL HISTORY
[FreeTextEntry1] : At the beginning of college the patient went to the [employer] [facility] [place]. Prior to that while at [employer] he felt isolative upset and kept to himself. Someone asked him what was going on he threatened to kill them. Because of that he was suspended. He had to see a therapist until he could go back to school. At [place] he felt stressed out depressed and couldn't function. He felt overwhelmed and thought that everybody around him was better. The patient had some passive thoughts of self-harm wishing he would just disappear. Patient was started on Zoloft was titrated up to 200 mg. His primary care physician is now titrating it down and the patient is 100 mg.

## 2024-03-13 NOTE — HISTORY OF PRESENT ILLNESS
[de-identified] : Patient is here in the office for face-to-face interview with writer for 1 month follow-up visit.  Last month Wellbutrin was increased from 300 to 450 mg. Patient states he didn't see any change from the increase.   States on Friday he had an exam. States it was hard. I felt very overwhelmed. I was red, I was sweaty. I saw myself and i looked deranged. Everyone saw me and was asking me are you ok? I do not want anyone to feel i am going to hurt them or anything." Grades have not been posted. Patient si seen to be having catastrophic thinking on an exam he does not know how he did.  Mood: Has this irrational fear Sleep: 7-8 hours per night. No more waking up in the middle of the night.   Appetite: is good Energy: low at times. States especially in classes he is falling asleep. Only happens during class.  Concentration and motivation all could be better.  Denies any AVH, SI or HI.

## 2024-03-13 NOTE — PAST MEDICAL HISTORY
[FreeTextEntry1] : At the beginning of college the patient went to the [employer] [facility] [place]. Prior to that while at [employer] he felt isolative upset and kept to himself. Someone asked him what was going on he threatened to kill them. Because of that he was suspended. He had to see a therapist until he could go back to school. At [place] he felt stressed out depressed and couldn't function. He felt overwhelmed and thought that everybody around him was better. The patient had some passive thoughts of self-harm wishing he would just disappear. Patient was started on Zoloft was titrated up to 200 mg. His primary care physician is now titrating it down and the patient is 100 mg.

## 2024-03-13 NOTE — FAMILY HISTORY
[FreeTextEntry1] : Patient was born in [place]. Mother 52 is a cartographer. Father 55 is a . He has no brothers or sisters. His maternal cousin who had mood swings and her maternal grandmother who was bipolar. There is no other psychiatric alcohol or drug history in the family there is no abuse history growing up.

## 2024-03-13 NOTE — PAST MEDICAL HISTORY
[FreeTextEntry1] : At the beginning of college the patient went to the [employer] at [place]. Prior to that while at [employer] he felt isolative upset and kept to himself. Someone asked him what was going on he threatened to kill them. Because of that he was suspended. He had to see a therapist until he could go back to school. At [place] he felt stressed out depressed and couldn't function. He felt overwhelmed and thought that everybody around him was better. The patient had some passive thoughts of self-harm wishing he would just disappear. Patient was started on Zoloft was titrated up to 200 mg. His primary care physician is now titrating it down and the patient is 100 mg.

## 2024-03-13 NOTE — FAMILY HISTORY
[FreeTextEntry1] : Patient was born in [place]. Mother 52 is a cartographer. Father 55 is a . He has no brothers or sisters. His maternal cousin who had mood swings and her maternal grandmother who was bipolar. There is no other psychiatric alcohol or drug history in the family there is no abuse history growing up.

## 2024-03-13 NOTE — SOCIAL HISTORY
[FreeTextEntry1] : Patient grew up in [place]. He attended [employer]. He graduated in 2009. He was an honor student. He participated no activities or sports. He still has 3 friends from [employer] school that he sees on a regular basis. The patient then went to [employer] school graduating in 2013. His grades were in the 90s. He participated no activities and had no friends. Patient currently is student at [employer] in [place]. He has a 3.2 grade point average in biochemistry. He has no friends. The patient plays video games. He currently has a purple belt and Chinese martial arts.

## 2024-03-13 NOTE — FAMILY HISTORY
[FreeTextEntry1] : Patient was born in [place]. Mother 52 is a cartographer. Father 55 is a . He has no brothers or sisters. His maternal cousin who had mood swings and her maternal grandmother who was bipolar. There is no other psychiatric alcohol or drug history in the family there is no abuse history growing up.

## 2024-03-13 NOTE — HISTORY OF PRESENT ILLNESS
[de-identified] : Patient is here in the office for face-to-face interview with writer for 1 month follow-up visit.  Last month Wellbutrin was increased from 300 to 450 mg. Patient states he didn't see any change from the increase.   States on Friday he had an exam. States it was hard. I felt very overwhelmed. I was red, I was sweaty. I saw myself and i looked deranged. Everyone saw me and was asking me are you ok? I do not want anyone to feel i am going to hurt them or anything." Grades have not been posted. Patient si seen to be having catastrophic thinking on an exam he does not know how he did.  Mood: Has this irrational fear Sleep: 7-8 hours per night. No more waking up in the middle of the night.   Appetite: is good Energy: low at times. States especially in classes he is falling asleep. Only happens during class.  Concentration and motivation all could be better.  Denies any AVH, SI or HI.

## 2024-03-15 NOTE — PLAN
[Recommended Frequency of Visits: ____] : Recommended frequency of visits: [unfilled] [Return in ____ week(s)] : Return in [unfilled] week(s) [FreeTextEntry2] : Increased understanding of anxious feelings\par  Learn relaxation techniques to reduce anxiety (thought stopping, thought switching, creative visualization, progressive muscle relaxation, deep breathing)\par  Learn about adverse childhood experiences and healing childhood attachment injuries (IFS)\par  Engage in a productive activity \par  Follow Safety Plan  [Skills training (all types)] : Skills training (all types)  [Supportive Therapy] : Supportive Therapy [Other: ____] : [unfilled] [de-identified] : Pt. was engaged in session; affect flat and mood depressed. 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 Pt. reports he felt emotionally overwhelmed, and described this experience. Pt. reports he experienced intrusive thoughts and felt helpless and hopeless. Pt. indicates that he did not feel confident taking the exam and as a result was in a "self loathing spiral." Pt. reports he was able to finish the exam, and for the rest of the day was experiencing intrusive thoughts. LORNA empathized with pt. and worked with him in identifying his physiological response to this event.  [FreeTextEntry1] : Meet for weekly individual counseling, level of care is appropriate. Pt. will refer to safety plan.

## 2024-03-20 ENCOUNTER — APPOINTMENT (OUTPATIENT)
Dept: PSYCHIATRY | Facility: CLINIC | Age: 29
End: 2024-03-20
Payer: COMMERCIAL

## 2024-03-20 PROCEDURE — 90837 PSYTX W PT 60 MINUTES: CPT

## 2024-03-22 NOTE — ECT OUTPATIENT PROGRAM DISCHARGE SUMMARY - NSECTTREATMENTSUMMARY_PSY_ALL_CORE
Patient underwent a total of 21 bifrontal treatments. After twelve acute treatments the patient shifted to maintenance ECT and maintained a stable mood through several monthly treatments. At his penultimate visit his cognition score was 10/10 on the [facility] cognition scale. He had no cognitive complaints at last visit. On 12/21/2023 he canceled his last appointment citing a change in treatment. He was to follow up closely with his primary psychiatrist.

## 2024-03-22 NOTE — ECT OUTPATIENT PROGRAM DISCHARGE SUMMARY - NSECTFIRSTTXDATE_PSY_ALL_CORE
Group Topic: BH Process Group     Date: September 4  Start Time: 11:15 AM  End Time: 12:15 PM    Focus: Recovery   Number in attendance: 4      Attendance: Present  Response Communication: Demonstrates insight  MoodAffect: Fearful/Anxious  Behavior/Socialization: Withdrawn/Tearful  Thought Process: Loose  Patient Response: Attentive, Good eye contact and Interactive     Today is Anibal’s first day in [facility]. He stepped down to [facility] from [facility]. He is seeking treatment for panic attacks. He is not able to identify a triggering event or place. He isolates which makes the panic worse. Anibal notices that he obsesses and ruminates over worries. He has struggles with this since in susan year of high school. He is engaged and has been with her for the last 4 years. He hopes to learn to work on distracting and seeking help when he feels stuck. Since DC from [employer] he has only had one panic attack. Assigned 25 self-positives.     JUAN JOSE Thompson     22-Mar-2024 13:51

## 2024-03-22 NOTE — PHYSICAL EXAM
[Cooperative] : cooperative [Depressed] : depressed [Flat] : flat [Clear] : clear [Linear/Goal Directed] : linear/goal directed [None Reported] : none reported [Depressive] : depressive [Average] : average [WNL] : within normal limits

## 2024-03-22 NOTE — RISK ASSESSMENT
[No, patient denies ideation or behavior] : No, patient denies ideation or behavior [Depressed mood/Anhedonia] : depressed mood/anhedonia [Hopelessness or despair] : hopelessness or despair [Supportive social network of family or friends] : supportive social network of family or friends [Triggering events leading to humiliation, shame, and/or despair] : triggering events leading to humiliation, shame, and/or despair (e.g. loss of relationship, financial or health status) (real or anticipated) [Positive therapeutic relationships] : positive therapeutic relationships [Frustration tolerance] : frustration tolerance [FreeTextEntry8] : Safety Plan reviewed [Moderate acute suicide risk] : Moderate acute suicide risk [FreeTextEntry9] : Pt. reports he will follow safety plan.  [No] : No [Yes] : Safety Plan completed/updated (for individuals at risk): Yes

## 2024-03-22 NOTE — PLAN
[FreeTextEntry2] : Increased understanding of anxious feelings\par  Learn relaxation techniques to reduce anxiety (thought stopping, thought switching, creative visualization, progressive muscle relaxation, deep breathing)\par  Learn about adverse childhood experiences and healing childhood attachment injuries (IFS)\par  Engage in a productive activity \par  Follow Safety Plan  [Skills training (all types)] : Skills training (all types)  [Supportive Therapy] : Supportive Therapy [Other: ____] : [unfilled] [de-identified] : Pt. was engaged in session; affect flat and mood depressed. This session pt. and SW discussed and explored his hopeless and despairing parts that feel and experience life as "meaningless and fruitless." Pt. reports that despite him enrolling in university and taking steps to invest in himself, he still feels a strong sense of hopelessness that his efforts are in vein and that he will "never overcome his programming." Pt. reports he does not have plans to hurt himself, and spoke about how he uses distractions to avoid thinking and feeling about his hopelessness. SW empathized with pt. and worked with him in identifying self care practices.  [Return in ____ week(s)] : Return in [unfilled] week(s) [Recommended Frequency of Visits: ____] : Recommended frequency of visits: [unfilled] [FreeTextEntry1] : Meet for weekly individual counseling, level of care is appropriate. Pt. will refer to safety plan.

## 2024-03-27 ENCOUNTER — APPOINTMENT (OUTPATIENT)
Dept: PSYCHIATRY | Facility: CLINIC | Age: 29
End: 2024-03-27
Payer: COMMERCIAL

## 2024-03-27 PROCEDURE — 90837 PSYTX W PT 60 MINUTES: CPT

## 2024-04-01 NOTE — REASON FOR VISIT
[Patient preference] : as per patient preference [Starting, patient seen in-person within last 6 months] : Telehealth services are being started as patient has seen in-person within last 6 months. [Telehealth (audio & video) - Individual/Group] : This visit was provided via telehealth using real-time 2-way audio visual technology. [Other Location: e.g. Home (Enter Location, City,State)___] : The provider was located at [unfilled]. [Home] : The patient, [unfilled], was located at home, [unfilled], at the time of the visit. [Verbal consent obtained from patient/other participant(s)] : Verbal consent for telehealth/telephonic services obtained from patient/other participant(s) [FreeTextEntry5] : 4:00 [FreeTextEntry4] : 3:00 [Patient] : Patient

## 2024-04-01 NOTE — PLAN
[FreeTextEntry2] : Increased understanding of anxious feelings\par  Learn relaxation techniques to reduce anxiety (thought stopping, thought switching, creative visualization, progressive muscle relaxation, deep breathing)\par  Learn about adverse childhood experiences and healing childhood attachment injuries (IFS)\par  Engage in a productive activity \par  Follow Safety Plan  [[facility]] : [facility]  [Other: ____] : [unfilled] [Supportive Therapy] : Supportive Therapy [de-identified] : Pt. was engaged in session; affect flat and mood depressed. This session pt. spoke about the part of him that feels hopeless and helpless that he will lead a "normal and productive life." Pt. described how he perceives himself and the disappointment he has caused in his parents and those who know him, leading him to internalize shame and self-hate. Pt. shared that he continues to attend his university classes, and is doing well overall academically. However, pt. reports fundamentally he feels like an outsider in society, and ultimately does not believe he can overcome his faults. SW empathized with pt. and processed his inner conflict with his parts.  [Recommended Frequency of Visits: ____] : Recommended frequency of visits: [unfilled] [Return in ____ week(s)] : Return in [unfilled] week(s) [FreeTextEntry1] : Meet for weekly individual counseling, level of care is appropriate. Pt. will refer to safety plan.

## 2024-04-01 NOTE — PHYSICAL EXAM
[Cooperative] : cooperative [Depressed] : depressed [Clear] : clear [Flat] : flat [Linear/Goal Directed] : linear/goal directed [Depressive] : depressive [None Reported] : none reported [Average] : average [WNL] : within normal limits

## 2024-04-03 ENCOUNTER — APPOINTMENT (OUTPATIENT)
Dept: PSYCHIATRY | Facility: CLINIC | Age: 29
End: 2024-04-03
Payer: COMMERCIAL

## 2024-04-03 PROCEDURE — 90837 PSYTX W PT 60 MINUTES: CPT

## 2024-04-05 NOTE — PLAN
[FreeTextEntry2] : Increased understanding of anxious feelings\par  Learn relaxation techniques to reduce anxiety (thought stopping, thought switching, creative visualization, progressive muscle relaxation, deep breathing)\par  Learn about adverse childhood experiences and healing childhood attachment injuries (IFS)\par  Engage in a productive activity \par  Follow Safety Plan  [Skills training (all types)] : Skills training (all types)  [Supportive Therapy] : Supportive Therapy [Other: ____] : [unfilled] [de-identified] : Pt. was engaged in session; affect flat and mood depressed. This session focused on patients part that feels discouraged, hopeless and ashamed that he has "disappointed" his parents to the degree he has. Pt. reports he has had potential to be successful in the past, but did not make the most of his opportunities. Pt. reports that despite being enrolled in university he still does not think that he has a future ahead of him. SW empathized with pt. and worked with him in speaking to his manager parts that feel it is not worth it to take care of himself.  [Recommended Frequency of Visits: ____] : Recommended frequency of visits: [unfilled] [Return in ____ week(s)] : Return in [unfilled] week(s) [FreeTextEntry1] : Meet for weekly individual counseling, level of care is appropriate. Pt. will refer to safety plan.

## 2024-04-10 ENCOUNTER — APPOINTMENT (OUTPATIENT)
Dept: PSYCHIATRY | Facility: CLINIC | Age: 29
End: 2024-04-10
Payer: COMMERCIAL

## 2024-04-10 PROCEDURE — 99214 OFFICE O/P EST MOD 30 MIN: CPT

## 2024-04-10 PROCEDURE — 90837 PSYTX W PT 60 MINUTES: CPT

## 2024-04-10 RX ORDER — BUPROPION HYDROCHLORIDE 300 MG/1
300 TABLET, EXTENDED RELEASE ORAL DAILY
Qty: 90 | Refills: 0 | Status: COMPLETED | COMMUNITY
Start: 2022-05-27 | End: 2024-04-10

## 2024-04-10 NOTE — HISTORY OF PRESENT ILLNESS
[de-identified] : Patient is here in the office for face-to-face interview with writer for 1 month follow-up visit.  Last month Wellbutrin was decreased from 450to 300 mg as 450 mg was not helping him.   States she had a micro bio exam and got a 89.5 . States he is not proud of himself. Read an excerpt from his phone as to how he has been feeling. Has been feeling hopeless, decreased self-worth and esteem. +Passive SI no plan.   Mood: depressed and anxious, irritational fear Sleep: 6-8 hours per night. No more waking up in the middle of the night.   Appetite: is good Energy: low at times. States especially in classes he is falling asleep. Only happens during class.  Concentration and motivation all could be better.  Denies any AVH, SI or HI.

## 2024-04-10 NOTE — SOCIAL HISTORY
[FreeTextEntry1] : Patient grew up in [place]. He attended [employer]. He graduated in 2009. He was an honor student. He participated no activities or sports. He still has 3 friends from [employer] school that he sees on a regular basis. The patient then went to [employer] school graduating in 2013. His grades were in the 90s. He participated no activities and had no friends. Patient currently is student at [employer] in [place]. He has a 3.2 grade point average in biochemistry. He has no friends. The patient plays video games. He currently has a purple belt and Chinese martial arts.

## 2024-04-10 NOTE — PAST MEDICAL HISTORY
[FreeTextEntry1] : At the beginning of college the patient went to the [employer] at [place]. Prior to that while at [employer] he felt isolative upset and kept to himself. Someone asked him what was going on he threatened to kill them. Because of that he was suspended. He had to see a therapist until he could go back to school. At [place] he felt stressed out depressed and couldn't function. He felt overwhelmed and thought that everybody around him was better. The patient had some passive thoughts of self-harm wishing he would just disappear. Patient was started on Zoloft was titrated up to 200 mg. His primary care physician is now titrating it down and the patient is 100 mg.

## 2024-04-10 NOTE — FAMILY HISTORY
[FreeTextEntry1] : Patient was born in [place]. Mother 52 is a cartographer. Father 55 is a . He has no brothers or sisters. His maternal cousin who had mood swings and her maternal grandmother who was bipolar. There is no other psychiatric alcohol or drug history in the family there is no abuse history growing up.

## 2024-04-10 NOTE — DISCUSSION/SUMMARY
[FreeTextEntry1] : Assessment: Patient is a 26-year-old male with h/o anxiety and depression seen today for medication management. Patient is compliant with his medications, tolerating them well without any side effects. I-STOP was checked without any issues.   PLAN: D/C with ECT biweekly. Decrease Wellbutrin 300 to 150 mg PO QAM for depression, low motivation, concentration energy, and SSRI induced sexual dysfunction Continue Lexapro 20 mg PO QD for depression and anxiety. - Discussed risks and benefits of medications including side effects of GI and sexual side effects with SSRI. Alternative strategies including no intervention discussed with patient. Patient consents to current medications as prescribed - Patient understands to contact clinic prn with concerns and agrees to call 911 or go to nearest ER if symptoms worsen. - Next appointment made in 1 months. Patient left the office without any distress

## 2024-04-12 NOTE — PLAN
[FreeTextEntry2] : Increased understanding of anxious feelings\par  Learn relaxation techniques to reduce anxiety (thought stopping, thought switching, creative visualization, progressive muscle relaxation, deep breathing)\par  Learn about adverse childhood experiences and healing childhood attachment injuries (IFS)\par  Engage in a productive activity \par  Follow Safety Plan  [[place] [facility]] : [facility]  [Skills training (all types)] : Skills training (all types)  [Supportive Therapy] : Supportive Therapy [de-identified] : Pt. was engaged in session; affect flat and mood depressed. This session focused on patients skepticism and cynicism that he can overcome his "flaws" and become a functioning member of society. Pt. spoke about his lack of hope and motivation to see a viable future for himself, and feels life would be better if he were not in it. Pt. shared that he does not have plans of suicide or any intention of harming himself, and is able to distract himself outside of therapy so he can "do what he needs to." Pt. reports he is doing well in his college classes, and will enroll in classes for the Fall 2024 semester. SW worked with patient in identifying a behavioral activation plan.  [Recommended Frequency of Visits: ____] : Recommended frequency of visits: [unfilled] [Return in ____ week(s)] : Return in [unfilled] week(s) [FreeTextEntry1] : Meet for weekly individual counseling, level of care is appropriate. Pt. will refer to safety plan.

## 2024-04-17 ENCOUNTER — APPOINTMENT (OUTPATIENT)
Dept: PSYCHIATRY | Facility: CLINIC | Age: 29
End: 2024-04-17
Payer: COMMERCIAL

## 2024-04-17 PROCEDURE — 90837 PSYTX W PT 60 MINUTES: CPT

## 2024-04-24 ENCOUNTER — APPOINTMENT (OUTPATIENT)
Dept: PSYCHIATRY | Facility: CLINIC | Age: 29
End: 2024-04-24
Payer: COMMERCIAL

## 2024-04-24 PROCEDURE — 90837 PSYTX W PT 60 MINUTES: CPT

## 2024-04-28 NOTE — PLAN
[FreeTextEntry2] : Increased understanding of anxious feelings\par  Learn relaxation techniques to reduce anxiety (thought stopping, thought switching, creative visualization, progressive muscle relaxation, deep breathing)\par  Learn about adverse childhood experiences and healing childhood attachment injuries (IFS)\par  Engage in a productive activity \par  Follow Safety Plan  [Cognitive and/or Behavior Therapy] : Cognitive and/or Behavior Therapy  [[facility]] : [facility]  [Supportive Therapy] : Supportive Therapy [de-identified] : Pt. was engaged in session; affect flat and mood depressed. This session pt. and SW discussed and explored his apathetic feelings towards himself and the direction he is taking in life. Pt. reports that despite being a college student and heading towards obtaining a degree, he does not believe that he will have a "worthwhile future." Pt. reports he is "too late in the game" to start over, and believes that he made too many mistakes to overcome them. SW empathized with patient and worked with him in identifying and challenging cognitive schemas and ideas.  [Recommended Frequency of Visits: ____] : Recommended frequency of visits: [unfilled] [Return in ____ week(s)] : Return in [unfilled] week(s) [FreeTextEntry1] : Meet for weekly individual counseling, level of care is appropriate. Pt. will refer to safety plan.

## 2024-05-01 ENCOUNTER — APPOINTMENT (OUTPATIENT)
Dept: PSYCHIATRY | Facility: CLINIC | Age: 29
End: 2024-05-01
Payer: COMMERCIAL

## 2024-05-01 PROCEDURE — 90837 PSYTX W PT 60 MINUTES: CPT

## 2024-05-01 NOTE — REASON FOR VISIT
Difficulty of Paying Living Expenses: Not hard at all   Food Insecurity: No Food Insecurity (5/1/2024)    Hunger Vital Sign     Worried About Running Out of Food in the Last Year: Never true     Ran Out of Food in the Last Year: Never true   Transportation Needs: Unknown (5/1/2024)    PRAPARE - Transportation     Lack of Transportation (Non-Medical): No   Housing Stability: Unknown (5/1/2024)    Housing Stability Vital Sign     Unstable Housing in the Last Year: No     Current Outpatient Medications   Medication Sig Dispense Refill    meloxicam (MOBIC) 15 MG tablet take 1 tablet by mouth once daily 30 tablet 5    gabapentin (NEURONTIN) 300 MG capsule Take 1 capsule by mouth 2 times daily for 180 days. 60 capsule 5     No current facility-administered medications for this visit.     No Known Allergies      Review of Systems:   General ROS: negative for - nausea, vomiting, chills, fatigue, fever, malaise, weight gain or weight loss  Respiratory ROS: no cough, shortness of breath, or wheezing  Cardiovascular ROS: no chest pain or dyspnea on exertion  Gastrointestinal ROS: no abdominal pain, change in bowel habits, or black or bloody stools  Genito-Urinary ROS: no dysuria, trouble voiding, or hematuria  Musculoskeletal ROS: per HPI  In general patient otherwise reports feeling well.     Musculoskeletal per HPI    In general patient otherwise reports feeling well.     Physical Exam:  /70   Pulse 83   Temp 97.5 °F (36.4 °C)   Ht 1.829 m (6')   Wt 110.9 kg (244 lb 9.6 oz)   SpO2 96%   BMI 33.17 kg/m²     Gen: Well, NAD, Alert, Oriented x 3   HEENT: EOMI, eyes clear, MMM  Skin: without rash or jaundice, sebaceous hyperplasia type lesion under lower lip  Neck: no significant lymphadenopathy or thyromegaly  Lungs: CTA B w/out Rales/Wheezes/Rhonchi, Good respiratory effort   Heart: RRR, S1S2, w/out M/R/G, non-displaced PMI   Ext: No C/C/E Bilaterally.   Neuro: Neurovascularly intact w/ Sensory/Motor intact UE/LE  [Patient] : Patient

## 2024-05-05 NOTE — PLAN
[[facility]] : [facility]  [FreeTextEntry2] : Increased understanding of anxious feelings\par  Learn relaxation techniques to reduce anxiety (thought stopping, thought switching, creative visualization, progressive muscle relaxation, deep breathing)\par  Learn about adverse childhood experiences and healing childhood attachment injuries (IFS)\par  Engage in a productive activity \par  Follow Safety Plan  [Skills training (all types)] : Skills training (all types)  [Supportive Therapy] : Supportive Therapy [de-identified] : Pt. was engaged in session; affect and mood WNL. This session pt. reports he has been in a better state of mind since last appointment. Pt. spoke about his college courses, and the end of his semester. Pt. reports he is proud of himself for completing this semester and passing his courses. Pt. described how he has been spending his time this past week, and reports he has been helping his father with a project in their house. LORNA empathized with pt. and reviewed self care emotion regulation coping mechanisms.  [Recommended Frequency of Visits: ____] : Recommended frequency of visits: [unfilled] [Return in ____ week(s)] : Return in [unfilled] week(s) [FreeTextEntry1] : Meet for weekly individual counseling, level of care is appropriate. Pt. will refer to safety plan.

## 2024-05-08 ENCOUNTER — APPOINTMENT (OUTPATIENT)
Dept: PSYCHIATRY | Facility: CLINIC | Age: 29
End: 2024-05-08
Payer: COMMERCIAL

## 2024-05-08 ENCOUNTER — APPOINTMENT (OUTPATIENT)
Dept: PSYCHIATRY | Facility: CLINIC | Age: 29
End: 2024-05-08
Payer: SELF-PAY

## 2024-05-08 PROCEDURE — 99214 OFFICE O/P EST MOD 30 MIN: CPT

## 2024-05-08 PROCEDURE — 90837 PSYTX W PT 60 MINUTES: CPT

## 2024-05-08 NOTE — HISTORY OF PRESENT ILLNESS
[de-identified] : Patient is here in the office for face-to-face interview with writer for 1 month follow-up visit.  Last month Wellbutrin was decreased from 300 to 150 mg due to feeling jittery. Dose of 150 mg seems to be ok. States he is doing well. Next Friday is his Microbio final. His Chemistry final is in 2 weeks. States he is doing well. Got a new haircut.   Taking Summer classes over the summer.  Denies feeling hopeless anymore. Denies any passive SI.   Mood: better. Less depression and anxiety Sleep: 6-8 hours per night. No more waking up in the middle of the night.   Appetite: is good Energy: is better  Concentration and motivation better.  Denies any AVH, SI or HI.

## 2024-05-08 NOTE — DISCUSSION/SUMMARY
[FreeTextEntry1] : Assessment: Patient is a 26-year-old male with h/o anxiety and depression seen today for medication management. Patient is compliant with his medications, tolerating them well without any side effects. I-STOP was checked without any issues.   PLAN: D/C with ECT biweekly. Continue Wellbutrin 150 mg PO QAM for depression, low motivation, concentration energy, and SSRI induced sexual dysfunction Continue Lexapro 20 mg PO QD for depression and anxiety. - Discussed risks and benefits of medications including side effects of GI and sexual side effects with SSRI. Alternative strategies including no intervention discussed with patient. Patient consents to current medications as prescribed - Patient understands to contact clinic prn with concerns and agrees to call 911 or go to nearest ER if symptoms worsen. - Next appointment made in 1 months. Patient left the office without any distress

## 2024-05-08 NOTE — SOCIAL HISTORY
[FreeTextEntry1] : Patient grew up in [place]. He attended [employer]. He graduated in 2009. He was an honor student. He participated no activities or sports. He still has 3 friends from [employer] school that he sees on a regular basis. The patient then went to [employer] graduating in 2013. His grades were in the 90s. He participated no activities and had no friends. Patient currently is student at [employer] in [place]. He has a 3.2 grade point average in biochemistry. He has no friends. The patient plays video games. He currently has a purple belt and Chinese martial arts.

## 2024-05-08 NOTE — FAMILY HISTORY
[FreeTextEntry1] : Patient was born in [place]. Mother 52 is a cartographer. Father 55 is a . He has no brothers or sisters. His maternal cousin who had mood swings and her maternal grandmother who was bipolar. There is no other psychiatric alcohol or drug history in the family there is no abuse history growing up.

## 2024-05-08 NOTE — PAST MEDICAL HISTORY
[FreeTextEntry1] : At the beginning of college the patient went to the [employer] at [place]. Prior to that while at [employer] he felt isolative upset and kept to himself. Someone asked him what was going on he threatened to kill them. Because of that he was suspended. He had to see a therapist until he could go back to school. At [place] he felt stressed out depressed and couldn't function. He felt overwhelmed and thought that everybody around him was better. The patient had some passive thoughts of self-harm wishing he would just disappear. Patient was started on Zoloft was titrated up to 200 mg. His primary care physician is now titrating it down and the patient is 100 mg.

## 2024-05-10 NOTE — PLAN
[FreeTextEntry2] : Increased understanding of anxious feelings\par  Learn relaxation techniques to reduce anxiety (thought stopping, thought switching, creative visualization, progressive muscle relaxation, deep breathing)\par  Learn about adverse childhood experiences and healing childhood attachment injuries (IFS)\par  Engage in a productive activity \par  Follow Safety Plan  [[place]] : [facility]  [Skills training (all types)] : Skills training (all types)  [Supportive Therapy] : Supportive Therapy [de-identified] : Pt. was engaged in session; affect and mood WNL. This session pt. reports he is "feeling better" and has been engaged in his courses this past week. Pt. reports he does not know why some days and weeks he feels more stable than others, but feels it is best to not question that as it may cause him to ruminate. Pt. reports he is looking forward to the summer, and will be taking a couple summer classes. Pt. shared that when he is feeling better about himself, he sees the world differently than when he feels "indifferent and hopeless." SW validated pt. and worked with him in reviewing behavioral activation skills.  [Recommended Frequency of Visits: ____] : Recommended frequency of visits: [unfilled] [Return in ____ week(s)] : Return in [unfilled] week(s) [FreeTextEntry1] : Meet for weekly individual counseling, level of care is appropriate. Pt. will refer to safety plan.

## 2024-05-10 NOTE — HISTORY OF PRESENT ILLNESS
[de-identified] : Patient is here in the office for face-to-face interview with writer for 1 month follow-up visit.  Last month Wellbutrin was increased from 300 to 450 mg. Patient states he didn't see any change from the increase.   States on Friday he had an exam. States it was hard. I felt very overwhelmed. I was red, I was sweaty. I saw myself and i looked deranged. Everyone saw me and was asking me are you ok? I do not want anyone to feel i am going to hurt them or anything." Grades have not been posted. Patient si seen to be having catastrophic thinking on an exam he does not know how he did.  Mood: Has this irrational fear Sleep: 7-8 hours per night. No more waking up in the middle of the night.   Appetite: is good Energy: low at times. States especially in classes he is falling asleep. Only happens during class.  Concentration and motivation all could be better.  Denies any AVH, SI or HI.  Other

## 2024-05-15 ENCOUNTER — APPOINTMENT (OUTPATIENT)
Dept: PSYCHIATRY | Facility: CLINIC | Age: 29
End: 2024-05-15
Payer: SELF-PAY

## 2024-05-15 PROCEDURE — 90837 PSYTX W PT 60 MINUTES: CPT

## 2024-05-20 NOTE — PLAN
[FreeTextEntry2] : Increased understanding of anxious feelings\par  Learn relaxation techniques to reduce anxiety (thought stopping, thought switching, creative visualization, progressive muscle relaxation, deep breathing)\par  Learn about adverse childhood experiences and healing childhood attachment injuries (IFS)\par  Engage in a productive activity \par  Follow Safety Plan  [[place] [facility]] : [place] [place] [facility]  [Skills training (all types)] : Skills training (all types)  [Supportive Therapy] : Supportive Therapy [Other: ____] : [unfilled] [de-identified] : Pt. was engaged in session; affect and mood WNL. This session pt. reports his mood has been "better" and has been spending a good portion of his time preparing for final exams. Pt. reports he is looking forward for the end of the semester, and being closer to obtaining his Associates Degree. Pt. will be taking summer classes as well in order to speed up the time it will take him to obtain his degree. Pt. shared that he has not been having "negative and upsetting thoughts" about himself, the future and his past, and rather has been focusing on what he can control. SW empathized with pt. and reinforced his efforts to complete his associates degree.  [Recommended Frequency of Visits: ____] : Recommended frequency of visits: [unfilled] [Return in ____ week(s)] : Return in [unfilled] week(s) [FreeTextEntry1] : Meet for weekly individual counseling, level of care is appropriate. Pt. will refer to safety plan.

## 2024-05-22 ENCOUNTER — APPOINTMENT (OUTPATIENT)
Dept: PSYCHIATRY | Facility: CLINIC | Age: 29
End: 2024-05-22
Payer: COMMERCIAL

## 2024-05-22 PROCEDURE — 90837 PSYTX W PT 60 MINUTES: CPT

## 2024-05-25 NOTE — PLAN
[FreeTextEntry2] : Increased understanding of anxious feelings\par  Learn relaxation techniques to reduce anxiety (thought stopping, thought switching, creative visualization, progressive muscle relaxation, deep breathing)\par  Learn about adverse childhood experiences and healing childhood attachment injuries (IFS)\par  Engage in a productive activity \par  Follow Safety Plan  [[facility]] : [facility]  [Skills training (all types)] : Skills training (all types)  [Supportive Therapy] : Supportive Therapy [de-identified] : Pt. was engaged in session; affect and mood WNL. This session pt. and SW discussed and explored his college career and continuing to pursue his bachelors degree. Pt. reports he did well in his final exams, and will be enrolling in summer courses. Furthermore, pt. reports he was accepted to [employer], and will most likely attend there in the Fall of 2024. Pt. reports he attended that school before, and now has an opportunity to finish what he started. Pt. reports he feels "good" about where he presently is, and is looking forward to the summer. LORNA empathized with pt. and reviewed CBT skills and self care.  [Recommended Frequency of Visits: ____] : Recommended frequency of visits: [unfilled] [Return in ____ week(s)] : Return in [unfilled] week(s) [FreeTextEntry1] : Meet for weekly individual counseling, level of care is appropriate. Pt. will refer to safety plan.  ANXIETY

## 2024-05-29 ENCOUNTER — APPOINTMENT (OUTPATIENT)
Dept: PSYCHIATRY | Facility: CLINIC | Age: 29
End: 2024-05-29
Payer: COMMERCIAL

## 2024-05-29 PROCEDURE — 90834 PSYTX W PT 45 MINUTES: CPT

## 2024-06-03 NOTE — PLAN
[[facility]] : [facility]  [Skills training (all types)] : Skills training (all types)  [Supportive Therapy] : Supportive Therapy [Recommended Frequency of Visits: ____] : Recommended frequency of visits: [unfilled] [Return in ____ week(s)] : Return in [unfilled] week(s) [FreeTextEntry2] : Increased understanding of anxious feelings\par  Learn relaxation techniques to reduce anxiety (thought stopping, thought switching, creative visualization, progressive muscle relaxation, deep breathing)\par  Learn about adverse childhood experiences and healing childhood attachment injuries (IFS)\par  Engage in a productive activity \par  Follow Safety Plan  [de-identified] : Pt. was engaged in session; affect and mood WNL. This session focused on patients summer courses, and preparing himself for a four year college next semester. Pt. reports he has been enjoying his experience as a student once again, and finds it helpful that he is preoccupied with his studies. Pt. also spoke about his impression of clothing that women wear, and how he interprets that. SW explored this with patient and getting in touch with his needs and normalizing his feelings and needs.  [FreeTextEntry1] : Meet for weekly individual counseling, level of care is appropriate. Pt. will refer to safety plan.

## 2024-06-05 ENCOUNTER — APPOINTMENT (OUTPATIENT)
Dept: PSYCHIATRY | Facility: CLINIC | Age: 29
End: 2024-06-05
Payer: COMMERCIAL

## 2024-06-05 PROCEDURE — 90834 PSYTX W PT 45 MINUTES: CPT

## 2024-06-06 NOTE — PLAN
[FreeTextEntry2] : Increased understanding of anxious feelings\par  Learn relaxation techniques to reduce anxiety (thought stopping, thought switching, creative visualization, progressive muscle relaxation, deep breathing)\par  Learn about adverse childhood experiences and healing childhood attachment injuries (IFS)\par  Engage in a productive activity \par  Follow Safety Plan  [Cognitive and/or Behavior Therapy] : Cognitive and/or Behavior Therapy  [Skills training (all types)] : Skills training (all types)  [Supportive Therapy] : Supportive Therapy [de-identified] : Pt. was engaged in session; affect and mood WNL. This session attended to patients academic responsibilities, and the confidence he is gaining as he continues to do well in his studies. Pt. reports he did well on his first exam for his summer course, and feels for the first time in a long time "confident" in something he is doing. Pt. acknowledged that this is a new feeling for him, and is unsure how to make sense of it. SW empathized with pt. and worked with him in reinforcing his confidence and positive self talk.  [Recommended Frequency of Visits: ____] : Recommended frequency of visits: [unfilled] [Return in ____ week(s)] : Return in [unfilled] week(s) [FreeTextEntry1] : Meet for weekly individual counseling, level of care is appropriate. Pt. will refer to safety plan.

## 2024-06-12 ENCOUNTER — APPOINTMENT (OUTPATIENT)
Dept: PSYCHIATRY | Facility: CLINIC | Age: 29
End: 2024-06-12
Payer: COMMERCIAL

## 2024-06-12 PROCEDURE — 99214 OFFICE O/P EST MOD 30 MIN: CPT

## 2024-06-12 RX ORDER — ESCITALOPRAM OXALATE 20 MG/1
20 TABLET ORAL DAILY
Qty: 90 | Refills: 0 | Status: ACTIVE | COMMUNITY
Start: 2022-02-23 | End: 1900-01-01

## 2024-06-12 RX ORDER — BUPROPION HYDROCHLORIDE 150 MG/1
150 TABLET, EXTENDED RELEASE ORAL
Qty: 90 | Refills: 0 | Status: ACTIVE | COMMUNITY
Start: 2024-02-14 | End: 1900-01-01

## 2024-06-12 NOTE — PAST MEDICAL HISTORY
[FreeTextEntry1] : At the beginning of college the patient went to the [employer] at [place]. Prior to that while at [employer] he felt isolative upset and kept to himself. Someone asked him what was going on he threatened to kill them. Because of that he was suspended. He had to see a therapist until he could go back to school. At [employer] he felt stressed out depressed and couldn't function. He felt overwhelmed and thought that everybody around him was better. The patient had some passive thoughts of self-harm wishing he would just disappear. Patient was started on Zoloft was titrated up to 200 mg. His primary care physician is now titrating it down and the patient is 100 mg.

## 2024-06-12 NOTE — HISTORY OF PRESENT ILLNESS
[de-identified] : Patient is here in the office for face-to-face interview with writer for 1 month follow-up visit.  No medication changes last month. States he is 3 weeks into his summer classes which he feels he is doing well in. Got accepted to [employer] for [employer]. By the end of the summer, he will have an Associates in his hand from [employer] in biochemistry.   His grandmother is not doing well. Last surviving of his grandparents. He is very grateful to everyone and everything that is happening in his life.  Denies feeling hopeless anymore. Denies any passive SI.   Mood: better. Less depression and anxiety Sleep: 6-8 hours per night. No more waking up in the middle of the night.   Appetite: is good Energy: is better  Concentration and motivation better.  Denies any AVH, SI or HI.

## 2024-06-12 NOTE — FAMILY HISTORY
[FreeTextEntry1] : Patient was born in [place]. Mother 52 is a cartographer. Father 55 is a . He has no brothers or sisters. His maternal cousin who had mood swings and her maternal grandmother who was bipolar. There is no other psychiatric alcohol or drug history in the family there is no abuse history growing up.

## 2024-06-12 NOTE — DISCUSSION/SUMMARY
[FreeTextEntry1] : Assessment: Patient is a 26-year-old male with h/o anxiety and depression seen today for medication management. Patient is compliant with his medications, tolerating them well without any side effects. I-STOP was checked without any issues.   PLAN: D/C with ECT biweekly. Continue Wellbutrin 150 mg PO QAM for depression, low motivation, concentration energy, and SSRI induced sexual dysfunction Continue Lexapro 20 mg PO QD for depression and anxiety. - Discussed risks and benefits of medications including side effects of GI and sexual side effects with SSRI. Alternative strategies including no intervention discussed with patient. Patient consents to current medications as prescribed - Patient understands to contact clinic prn with concerns and agrees to call 911 or go to nearest ER if symptoms worsen. - Next appointment made in 3 months. Patient left the office without any distress

## 2024-06-12 NOTE — SOCIAL HISTORY
[FreeTextEntry1] : Patient grew up in [place]. He attended [employer]. He graduated in 2009. He was an honor student. He participated no activities or sports. He still has 3 friends from [employer] that he sees on a regular basis. The patient then went to [employer] school graduating in 2013. His grades were in the 90s. He participated no activities and had no friends. Patient currently is student at [employer] in [place]. He has a 3.2 grade point average in biochemistry. He has no friends. The patient plays video games. He currently has a purple belt and Chinese martial arts.

## 2024-06-19 ENCOUNTER — APPOINTMENT (OUTPATIENT)
Dept: PSYCHIATRY | Facility: CLINIC | Age: 29
End: 2024-06-19
Payer: COMMERCIAL

## 2024-06-19 DIAGNOSIS — F32.A DEPRESSION, UNSPECIFIED: ICD-10-CM

## 2024-06-19 PROCEDURE — 90834 PSYTX W PT 45 MINUTES: CPT

## 2024-06-19 NOTE — PLAN
[FreeTextEntry2] : Increased understanding of anxious feelings\par  Learn relaxation techniques to reduce anxiety (thought stopping, thought switching, creative visualization, progressive muscle relaxation, deep breathing)\par  Learn about adverse childhood experiences and healing childhood attachment injuries (IFS)\par  Engage in a productive activity \par  Follow Safety Plan  [Cognitive and/or Behavior Therapy] : Cognitive and/or Behavior Therapy  [Skills training (all types)] : Skills training (all types)  [Supportive Therapy] : Supportive Therapy [de-identified] : Pt. was engaged in session; affect flat and mood depressed. This session focused on patients conflicted feelings about interacting with others and building relationships, versus wanting to avoid them out of fear of persecution. Pt. 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 pt. reports they had a training on sexual misconduct, and treating others with respect and dignity. Pt. reports he took away from this training that he cannot speak to others without the fear of being misinterpreted and wrongly accused of misconduct. Pt. reports he does not interact with people now unless he is approached, as there is less potential for the ruining of his career and livelihood. SW empathized with pt. and explored his fears of being falsely accused.  [Recommended Frequency of Visits: ____] : Recommended frequency of visits: [unfilled] [Return in ____ week(s)] : Return in [unfilled] week(s) [FreeTextEntry1] : Meet for weekly individual counseling, level of care is appropriate. Pt. will refer to safety plan.

## 2024-06-26 ENCOUNTER — APPOINTMENT (OUTPATIENT)
Dept: PSYCHIATRY | Facility: CLINIC | Age: 29
End: 2024-06-26
Payer: COMMERCIAL

## 2024-06-26 DIAGNOSIS — F41.9 ANXIETY DISORDER, UNSPECIFIED: ICD-10-CM

## 2024-06-26 DIAGNOSIS — F32.A DEPRESSION, UNSPECIFIED: ICD-10-CM

## 2024-06-26 PROCEDURE — 90834 PSYTX W PT 45 MINUTES: CPT

## 2024-07-02 ENCOUNTER — APPOINTMENT (OUTPATIENT)
Dept: PSYCHIATRY | Facility: CLINIC | Age: 29
End: 2024-07-02
Payer: COMMERCIAL

## 2024-07-02 PROCEDURE — 90837 PSYTX W PT 60 MINUTES: CPT

## 2024-07-03 ENCOUNTER — APPOINTMENT (OUTPATIENT)
Dept: PSYCHIATRY | Facility: CLINIC | Age: 29
End: 2024-07-03

## 2024-07-08 ENCOUNTER — APPOINTMENT (OUTPATIENT)
Dept: PSYCHIATRY | Facility: CLINIC | Age: 29
End: 2024-07-08
Payer: COMMERCIAL

## 2024-07-08 PROCEDURE — 90837 PSYTX W PT 60 MINUTES: CPT

## 2024-07-10 ENCOUNTER — APPOINTMENT (OUTPATIENT)
Dept: PSYCHIATRY | Facility: CLINIC | Age: 29
End: 2024-07-10

## 2024-07-15 ENCOUNTER — APPOINTMENT (OUTPATIENT)
Dept: PSYCHIATRY | Facility: CLINIC | Age: 29
End: 2024-07-15
Payer: COMMERCIAL

## 2024-07-15 DIAGNOSIS — F32.A DEPRESSION, UNSPECIFIED: ICD-10-CM

## 2024-07-15 PROCEDURE — 90837 PSYTX W PT 60 MINUTES: CPT

## 2024-07-22 ENCOUNTER — APPOINTMENT (OUTPATIENT)
Dept: PSYCHIATRY | Facility: CLINIC | Age: 29
End: 2024-07-22
Payer: COMMERCIAL

## 2024-07-22 DIAGNOSIS — F32.A DEPRESSION, UNSPECIFIED: ICD-10-CM

## 2024-07-22 PROCEDURE — 90837 PSYTX W PT 60 MINUTES: CPT

## 2024-07-23 NOTE — PLAN
Detail Level: Zone Initiate Treatment: Recommend to apply TAC bid x 1-2 weeks prn. [Recommended Frequency of Visits: ____] : Recommended frequency of visits: [unfilled] [Return in ____ week(s)] : Return in [unfilled] week(s) [FreeTextEntry2] : Increased understanding of anxious feelings\par  Learn relaxation techniques to reduce anxiety (thought stopping, thought switching, creative visualization, progressive muscle relaxation, deep breathing)\par  Learn about adverse childhood experiences and healing childhood attachment injuries (IFS)\par  Engage in a productive activity \par  Follow Safety Plan  [Acceptance and Commitment Therapy] : Acceptance and Commitment Therapy  [Skills training (all types)] : Skills training (all types)  [Supportive Therapy] : Supportive Therapy [de-identified] : Pt. was engaged in session; affect flat and mood depressed. This session focused on patients feelings of helplessness and despair regarding the trajectory of his life and where his efforts will lead. Pt. spoke about the parts of him that feel life is not worth the effort, and has strong reservations that his efforts in school will lead to a "promising future." SW empathized with pt. and worked with him in getting in touch with his feelings of despair, and other parts such as anger and rage that manifest.  [FreeTextEntry1] : Meet for weekly individual counseling, level of care is appropriate. Pt. will refer to safety plan.

## 2024-07-29 ENCOUNTER — APPOINTMENT (OUTPATIENT)
Dept: PSYCHIATRY | Facility: CLINIC | Age: 29
End: 2024-07-29
Payer: COMMERCIAL

## 2024-07-29 DIAGNOSIS — F41.9 ANXIETY DISORDER, UNSPECIFIED: ICD-10-CM

## 2024-07-29 DIAGNOSIS — F32.A DEPRESSION, UNSPECIFIED: ICD-10-CM

## 2024-07-29 PROCEDURE — 90837 PSYTX W PT 60 MINUTES: CPT

## 2024-07-30 NOTE — PLAN
[Dialectical Behavior Therapy] : Dialectical Behavior Therapy  [[place] [facility]] : [facility] [place] [facility]  [Supportive Therapy] : Supportive Therapy [Recommended Frequency of Visits: ____] : Recommended frequency of visits: [unfilled] [Return in ____ week(s)] : Return in [unfilled] week(s) [FreeTextEntry2] : Increased understanding of anxious feelings\par  Learn relaxation techniques to reduce anxiety (thought stopping, thought switching, creative visualization, progressive muscle relaxation, deep breathing)\par  Learn about adverse childhood experiences and healing childhood attachment injuries (IFS)\par  Engage in a productive activity \par  Follow Safety Plan  [de-identified] : Pt. was engaged in session; affect flat and mood depressed. This session pt. appeared melancholy, and spoke about how "worthless and useless" he feels. Pt. spoke about his academic progress, and not having hope that he will have a decent quality of life that would be worth living. Pt. states he feels anger and shame at himself for "burdening others." Pt. indicates that he does not like other people knowing him and feeling they need to support him, as that puts pressure and stress on him. SW empathized with pt. and worked with him on tolerating his distressing emotions and grounding himself into the here and now.  [FreeTextEntry1] : Meet for weekly individual counseling, level of care is appropriate. Pt. will refer to safety plan.

## 2024-08-05 ENCOUNTER — APPOINTMENT (OUTPATIENT)
Dept: PSYCHIATRY | Facility: CLINIC | Age: 29
End: 2024-08-05

## 2024-08-12 ENCOUNTER — APPOINTMENT (OUTPATIENT)
Dept: PSYCHIATRY | Facility: CLINIC | Age: 29
End: 2024-08-12
Payer: COMMERCIAL

## 2024-08-12 DIAGNOSIS — F32.A DEPRESSION, UNSPECIFIED: ICD-10-CM

## 2024-08-12 PROCEDURE — 90837 PSYTX W PT 60 MINUTES: CPT

## 2024-08-14 NOTE — PLAN
[Recommended Frequency of Visits: ____] : Recommended frequency of visits: [unfilled] [Return in ____ week(s)] : Return in [unfilled] week(s) [FreeTextEntry2] : Increased understanding of anxious feelings\par  Learn relaxation techniques to reduce anxiety (thought stopping, thought switching, creative visualization, progressive muscle relaxation, deep breathing)\par  Learn about adverse childhood experiences and healing childhood attachment injuries (IFS)\par  Engage in a productive activity \par  Follow Safety Plan  [Skills training (all types)] : Skills training (all types)  [Supportive Therapy] : Supportive Therapy [Other: ____] : [unfilled] [de-identified] : Pt. was engaged in session; affect and mood were appropriate. Pt. reports since last session he has been feeling "better" and has not been experiencing feelings of self doubt and hopelessness that is his experience so much of the time. Pt. reports that he does not know what happens in his mind that switches him from feeling "shame and despair" to the absence of that feeling, and being present. SW validated patients experience and worked with him on exploring his narrative that gives rise to parts of him that experience fear, shame and self doubt.  [FreeTextEntry1] : Meet for weekly individual counseling, level of care is appropriate. Pt. will refer to safety plan.

## 2024-08-14 NOTE — PLAN
[Recommended Frequency of Visits: ____] : Recommended frequency of visits: [unfilled] [Return in ____ week(s)] : Return in [unfilled] week(s) [FreeTextEntry2] : Increased understanding of anxious feelings\par  Learn relaxation techniques to reduce anxiety (thought stopping, thought switching, creative visualization, progressive muscle relaxation, deep breathing)\par  Learn about adverse childhood experiences and healing childhood attachment injuries (IFS)\par  Engage in a productive activity \par  Follow Safety Plan  [Skills training (all types)] : Skills training (all types)  [Supportive Therapy] : Supportive Therapy [Other: ____] : [unfilled] [de-identified] : Pt. was engaged in session; affect and mood were appropriate. Pt. reports since last session he has been feeling "better" and has not been experiencing feelings of self doubt and hopelessness that is his experience so much of the time. Pt. reports that he does not know what happens in his mind that switches him from feeling "shame and despair" to the absence of that feeling, and being present. SW validated patients experience and worked with him on exploring his narrative that gives rise to parts of him that experience fear, shame and self doubt.  [FreeTextEntry1] : Meet for weekly individual counseling, level of care is appropriate. Pt. will refer to safety plan.

## 2024-08-19 ENCOUNTER — APPOINTMENT (OUTPATIENT)
Dept: PSYCHIATRY | Facility: CLINIC | Age: 29
End: 2024-08-19
Payer: COMMERCIAL

## 2024-08-19 DIAGNOSIS — F32.A DEPRESSION, UNSPECIFIED: ICD-10-CM

## 2024-08-19 PROCEDURE — 90837 PSYTX W PT 60 MINUTES: CPT
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
## 2024-08-20 NOTE — PLAN
[Recommended Frequency of Visits: ____] : Recommended frequency of visits: [unfilled] [Return in ____ week(s)] : Return in [unfilled] week(s) [FreeTextEntry2] : Increased understanding of anxious feelings\par  Learn relaxation techniques to reduce anxiety (thought stopping, thought switching, creative visualization, progressive muscle relaxation, deep breathing)\par  Learn about adverse childhood experiences and healing childhood attachment injuries (IFS)\par  Engage in a productive activity \par  Follow Safety Plan  [Skills training (all types)] : Skills training (all types)  [Supportive Therapy] : Supportive Therapy [Other: ____] : [unfilled] [de-identified] : Pt. was engaged in session; affect and mood WNL. This session pt. and SW utilized IFS to speak from the parts of him that experience despair, shame and anger. Pt. reports overall he has been feeling better the past several weeks, and acknowledged that he shifts rapidly between states of "stability and contentment" to feeling hopeless and worthless. SW asked pt. to speak from the part of him that feels worthless, and what that part says about itself. SW had pt. do this with other parts that are angry and suicidal. Pt. was receptive to IFS intervention.  [FreeTextEntry1] : Meet for weekly individual counseling, level of care is appropriate. Pt. will refer to safety plan.

## 2024-08-26 ENCOUNTER — APPOINTMENT (OUTPATIENT)
Dept: PSYCHIATRY | Facility: CLINIC | Age: 29
End: 2024-08-26
Payer: COMMERCIAL

## 2024-08-26 PROCEDURE — 90834 PSYTX W PT 45 MINUTES: CPT

## 2024-08-27 NOTE — PLAN
[Recommended Frequency of Visits: ____] : Recommended frequency of visits: [unfilled] [Return in ____ week(s)] : Return in [unfilled] week(s) [FreeTextEntry2] : Increased understanding of anxious feelings\par  Learn relaxation techniques to reduce anxiety (thought stopping, thought switching, creative visualization, progressive muscle relaxation, deep breathing)\par  Learn about adverse childhood experiences and healing childhood attachment injuries (IFS)\par  Engage in a productive activity \par  Follow Safety Plan  [Cognitive and/or Behavior Therapy] : Cognitive and/or Behavior Therapy  [Skills training (all types)] : Skills training (all types)  [Supportive Therapy] : Supportive Therapy [de-identified] : Pt. was engaged in session; affect and mood were appropriate. Pt. spoke about beginning his first day of the Fall semester at [employer]. Pt. reports he is not sure how to feel about this. He reports he is not nervous, but rather anticipating how much work each class will have, as well as what his professors will be like. Pt. reports he feels prepared for another semester of college and does not want to think ahead in order to avoid becoming worried about how difficult the work might get. LORNA empathized with pt. and worked with him on reinforcing progress and focusing on things he can control.  [FreeTextEntry1] : Meet for weekly individual counseling, level of care is appropriate. Pt. will refer to safety plan.

## 2024-09-03 ENCOUNTER — APPOINTMENT (OUTPATIENT)
Dept: PSYCHIATRY | Facility: CLINIC | Age: 29
End: 2024-09-03
Payer: COMMERCIAL

## 2024-09-03 DIAGNOSIS — F32.A DEPRESSION, UNSPECIFIED: ICD-10-CM

## 2024-09-03 PROCEDURE — 90837 PSYTX W PT 60 MINUTES: CPT

## 2024-09-05 NOTE — PLAN
[Cognitive and/or Behavior Therapy] : Cognitive and/or Behavior Therapy  [Skills training (all types)] : Skills training (all types)  [Supportive Therapy] : Supportive Therapy [Recommended Frequency of Visits: ____] : Recommended frequency of visits: [unfilled] [Return in ____ week(s)] : Return in [unfilled] week(s) [FreeTextEntry2] : Increased understanding of anxious feelings\par  Learn relaxation techniques to reduce anxiety (thought stopping, thought switching, creative visualization, progressive muscle relaxation, deep breathing)\par  Learn about adverse childhood experiences and healing childhood attachment injuries (IFS)\par  Engage in a productive activity \par  Follow Safety Plan  [de-identified] : Pt. was engaged in session; affect and mood were appropriate. This session focused on his return to school and structuring his time efficiently. Pt. reports he is looking forward to his classes this semester, and genuinely finds the material interesting. Pt. reports his mood has been stable, and has not been consumed by thoughts of despair and hopelessness. SW empathized with pt. 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  [FreeTextEntry1] : Meet for weekly individual counseling, level of care is appropriate. Pt. will refer to safety plan.

## 2024-09-10 ENCOUNTER — APPOINTMENT (OUTPATIENT)
Dept: PSYCHIATRY | Facility: CLINIC | Age: 29
End: 2024-09-10
Payer: COMMERCIAL

## 2024-09-10 DIAGNOSIS — F32.A DEPRESSION, UNSPECIFIED: ICD-10-CM

## 2024-09-10 PROCEDURE — 90837 PSYTX W PT 60 MINUTES: CPT

## 2024-09-12 NOTE — RISK ASSESSMENT
[No, patient denies ideation or behavior] : No, patient denies ideation or behavior [Depressed mood/Anhedonia] : depressed mood/anhedonia [Hopelessness or despair] : hopelessness or despair [Triggering events leading to humiliation, shame, and/or despair] : triggering events leading to humiliation, shame, and/or despair (e.g. loss of relationship, financial or health status) (real or anticipated) [Supportive social network of family or friends] : supportive social network of family or friends [Positive therapeutic relationships] : positive therapeutic relationships [Frustration tolerance] : frustration tolerance [Moderate acute suicide risk] : Moderate acute suicide risk [No] : No [Yes] : Safety Plan completed/updated (for individuals at risk): Yes

## 2024-09-12 NOTE — PLAN
[Recommended Frequency of Visits: ____] : Recommended frequency of visits: [unfilled] [Return in ____ week(s)] : Return in [unfilled] week(s) [Supportive Therapy] : Supportive Therapy [Other: ____] : [unfilled]

## 2024-09-17 ENCOUNTER — APPOINTMENT (OUTPATIENT)
Dept: PSYCHIATRY | Facility: CLINIC | Age: 29
End: 2024-09-17
Payer: COMMERCIAL

## 2024-09-17 DIAGNOSIS — F32.A DEPRESSION, UNSPECIFIED: ICD-10-CM

## 2024-09-17 PROCEDURE — 90837 PSYTX W PT 60 MINUTES: CPT

## 2024-09-20 NOTE — PHYSICAL EXAM
[Cooperative] : cooperative [Depressed] : depressed [Flat] : flat [Clear] : clear [Linear/Goal Directed] : linear/goal directed [Depressive] : depressive [None Reported] : none reported [Average] : average [WNL] : within normal limits Mastoid Interpolation Flap Text: A decision was made to reconstruct the defect utilizing an interpolation axial flap and a staged reconstruction.  A telfa template was made of the defect.  This telfa template was then used to outline the mastoid interpolation flap.  The donor area for the pedicle flap was then injected with anesthesia.  The flap was excised through the skin and subcutaneous tissue down to the layer of the underlying musculature.  The pedicle flap was carefully excised within this deep plane to maintain its blood supply.  The edges of the donor site were undermined.   The donor site was closed in a primary fashion.  The pedicle was then rotated into position and sutured.  Once the tube was sutured into place, adequate blood supply was confirmed with blanching and refill.  The pedicle was then wrapped with xeroform gauze and dressed appropriately with a telfa and gauze bandage to ensure continued blood supply and protect the attached pedicle.

## 2024-09-20 NOTE — PLAN
[Recommended Frequency of Visits: ____] : Recommended frequency of visits: [unfilled] [Return in ____ week(s)] : Return in [unfilled] week(s) [FreeTextEntry2] : Increased understanding of anxious feelings\par  Learn relaxation techniques to reduce anxiety (thought stopping, thought switching, creative visualization, progressive muscle relaxation, deep breathing)\par  Learn about adverse childhood experiences and healing childhood attachment injuries (IFS)\par  Engage in a productive activity \par  Follow Safety Plan  [Cognitive and/or Behavior Therapy] : Cognitive and/or Behavior Therapy  [Skills training (all types)] : Skills training (all types)  [Supportive Therapy] : Supportive Therapy [de-identified] : Pt. was engaged in session; affect and mood WNL. This session focused on patients academic career and putting his efforts into this domain. Pt. reports he is enjoying his courses, and finds it appropriately challenging, but not too challenging where he feels overwhelmed. Pt. 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 Pt. reports he feels comfortable being in this environment and around his fellow students, and does "not have any complaints." SW empathized with pt. and worked with him in reinforcing cognitive behavioral tools.  [FreeTextEntry1] : Meet for weekly individual counseling, level of care is appropriate. Pt. will refer to safety plan.

## 2024-09-24 ENCOUNTER — APPOINTMENT (OUTPATIENT)
Dept: PSYCHIATRY | Facility: CLINIC | Age: 29
End: 2024-09-24
Payer: COMMERCIAL

## 2024-09-24 DIAGNOSIS — F32.A DEPRESSION, UNSPECIFIED: ICD-10-CM

## 2024-09-24 DIAGNOSIS — F41.9 ANXIETY DISORDER, UNSPECIFIED: ICD-10-CM

## 2024-09-24 PROCEDURE — 90837 PSYTX W PT 60 MINUTES: CPT

## 2024-09-27 NOTE — PLAN
[Recommended Frequency of Visits: ____] : Recommended frequency of visits: [unfilled] [Return in ____ week(s)] : Return in [unfilled] week(s) [FreeTextEntry2] : Increased understanding of anxious feelings\par  Learn relaxation techniques to reduce anxiety (thought stopping, thought switching, creative visualization, progressive muscle relaxation, deep breathing)\par  Learn about adverse childhood experiences and healing childhood attachment injuries (IFS)\par  Engage in a productive activity \par  Follow Safety Plan  [Cognitive Processing Therapy] : Cognitive Processing Therapy  [Psychodynamic Therapy] : Psychodynamic Therapy  [Supportive Therapy] : Supportive Therapy [de-identified] : Pt. was engaged in session; affect and mood WNL. This session focused on patients academics, and how he is finding his courses to be. Pt. reports he is doing well in his courses, and finds it appropriately challenging. Pt. reports his mood has been "better" and does not feel burdened by thoughts of his inner critic and dread of the future. Pt. 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 SW empathized with pt. and reviewed emotion regulation coping strategies.  [FreeTextEntry1] : Meet for weekly individual counseling, level of care is appropriate. Pt. will refer to safety plan.

## 2024-10-01 ENCOUNTER — APPOINTMENT (OUTPATIENT)
Dept: PSYCHIATRY | Facility: CLINIC | Age: 29
End: 2024-10-01
Payer: COMMERCIAL

## 2024-10-01 DIAGNOSIS — F32.A DEPRESSION, UNSPECIFIED: ICD-10-CM

## 2024-10-01 PROCEDURE — 90837 PSYTX W PT 60 MINUTES: CPT

## 2024-10-07 NOTE — PLAN
[Recommended Frequency of Visits: ____] : Recommended frequency of visits: [unfilled] [Return in ____ week(s)] : Return in [unfilled] week(s) [FreeTextEntry2] : Increased understanding of anxious feelings\par  Learn relaxation techniques to reduce anxiety (thought stopping, thought switching, creative visualization, progressive muscle relaxation, deep breathing)\par  Learn about adverse childhood experiences and healing childhood attachment injuries (IFS)\par  Engage in a productive activity \par  Follow Safety Plan  [Psychodynamic Therapy] : Psychodynamic Therapy  [Supportive Therapy] : Supportive Therapy [Other: ____] : [unfilled] [de-identified] : Pt. was engaged in session; affect flat and mood depressed. This session focused on patients feelings of disappointment and shame in himself, and how the part of him that sees no point in trying is the loudest voice in his head at this time. Pt. spoke about how it feels foolish for him to continue trying to do well academically and strive for a goal, since at the end of the day he manages to "mess up." Pt. reports he began feeling this way again after he did not submit an assignment that was due. Pt. reports he did not understand the material and did not submit it. SW empathized with pt. and worked with him in getting in touch with his hopelessness part, and how it just wants "to stop fighting." LORNA focused on this part via the 6F's of IFS.  [FreeTextEntry1] : Meet for weekly individual counseling, level of care is appropriate. Pt. will refer to safety plan.

## 2024-10-08 ENCOUNTER — APPOINTMENT (OUTPATIENT)
Dept: PSYCHIATRY | Facility: CLINIC | Age: 29
End: 2024-10-08
Payer: COMMERCIAL

## 2024-10-08 DIAGNOSIS — F32.A DEPRESSION, UNSPECIFIED: ICD-10-CM

## 2024-10-08 PROCEDURE — 90837 PSYTX W PT 60 MINUTES: CPT

## 2024-10-15 ENCOUNTER — APPOINTMENT (OUTPATIENT)
Dept: PSYCHIATRY | Facility: CLINIC | Age: 29
End: 2024-10-15
Payer: COMMERCIAL

## 2024-10-15 DIAGNOSIS — F32.A DEPRESSION, UNSPECIFIED: ICD-10-CM

## 2024-10-15 PROCEDURE — 90837 PSYTX W PT 60 MINUTES: CPT

## 2024-10-22 ENCOUNTER — APPOINTMENT (OUTPATIENT)
Dept: PSYCHIATRY | Facility: CLINIC | Age: 29
End: 2024-10-22
Payer: COMMERCIAL

## 2024-10-22 DIAGNOSIS — F32.A DEPRESSION, UNSPECIFIED: ICD-10-CM

## 2024-10-22 PROCEDURE — 90837 PSYTX W PT 60 MINUTES: CPT

## 2024-10-22 PROCEDURE — 99214 OFFICE O/P EST MOD 30 MIN: CPT

## 2024-10-22 RX ORDER — DEXTROAMPHETAMINE SACCHARATE, AMPHETAMINE ASPARTATE MONOHYDRATE, DEXTROAMPHETAMINE SULFATE AND AMPHETAMINE SULFATE 1.25; 1.25; 1.25; 1.25 MG/1; MG/1; MG/1; MG/1
5 CAPSULE, EXTENDED RELEASE ORAL
Qty: 30 | Refills: 0 | Status: ACTIVE | COMMUNITY
Start: 2024-10-22 | End: 1900-01-01

## 2024-10-24 PROBLEM — F98.8 ADD (ATTENTION DEFICIT DISORDER): Status: ACTIVE | Noted: 2024-10-22

## 2024-10-29 ENCOUNTER — APPOINTMENT (OUTPATIENT)
Dept: PSYCHIATRY | Facility: CLINIC | Age: 29
End: 2024-10-29
Payer: COMMERCIAL

## 2024-10-29 DIAGNOSIS — F32.A DEPRESSION, UNSPECIFIED: ICD-10-CM

## 2024-10-29 PROCEDURE — 90837 PSYTX W PT 60 MINUTES: CPT

## 2024-11-05 ENCOUNTER — APPOINTMENT (OUTPATIENT)
Dept: PSYCHIATRY | Facility: CLINIC | Age: 29
End: 2024-11-05
Payer: COMMERCIAL

## 2024-11-05 DIAGNOSIS — F32.A DEPRESSION, UNSPECIFIED: ICD-10-CM

## 2024-11-05 PROCEDURE — 90837 PSYTX W PT 60 MINUTES: CPT

## 2024-11-12 ENCOUNTER — APPOINTMENT (OUTPATIENT)
Dept: PSYCHIATRY | Facility: CLINIC | Age: 29
End: 2024-11-12
Payer: COMMERCIAL

## 2024-11-12 DIAGNOSIS — F98.8 OTHER SPECIFIED BEHAVIORAL AND EMOTIONAL DISORDERS WITH ONSET USUALLY OCCURRING IN CHILDHOOD AND ADOLESCENCE: ICD-10-CM

## 2024-11-12 DIAGNOSIS — F41.9 ANXIETY DISORDER, UNSPECIFIED: ICD-10-CM

## 2024-11-12 DIAGNOSIS — F32.A DEPRESSION, UNSPECIFIED: ICD-10-CM

## 2024-11-12 PROCEDURE — 90837 PSYTX W PT 60 MINUTES: CPT

## 2024-11-19 ENCOUNTER — APPOINTMENT (OUTPATIENT)
Dept: PSYCHIATRY | Facility: CLINIC | Age: 29
End: 2024-11-19

## 2024-11-26 ENCOUNTER — APPOINTMENT (OUTPATIENT)
Dept: PSYCHIATRY | Facility: CLINIC | Age: 29
End: 2024-11-26
Payer: COMMERCIAL

## 2024-11-26 DIAGNOSIS — F41.9 ANXIETY DISORDER, UNSPECIFIED: ICD-10-CM

## 2024-11-26 DIAGNOSIS — F32.A DEPRESSION, UNSPECIFIED: ICD-10-CM

## 2024-11-26 DIAGNOSIS — F98.8 OTHER SPECIFIED BEHAVIORAL AND EMOTIONAL DISORDERS WITH ONSET USUALLY OCCURRING IN CHILDHOOD AND ADOLESCENCE: ICD-10-CM

## 2024-11-26 PROCEDURE — 90837 PSYTX W PT 60 MINUTES: CPT

## 2024-12-03 ENCOUNTER — APPOINTMENT (OUTPATIENT)
Dept: PSYCHIATRY | Facility: CLINIC | Age: 29
End: 2024-12-03
Payer: COMMERCIAL

## 2024-12-03 PROCEDURE — 90837 PSYTX W PT 60 MINUTES: CPT

## 2024-12-10 ENCOUNTER — APPOINTMENT (OUTPATIENT)
Dept: PSYCHIATRY | Facility: CLINIC | Age: 29
End: 2024-12-10
Payer: COMMERCIAL

## 2024-12-10 DIAGNOSIS — F98.8 OTHER SPECIFIED BEHAVIORAL AND EMOTIONAL DISORDERS WITH ONSET USUALLY OCCURRING IN CHILDHOOD AND ADOLESCENCE: ICD-10-CM

## 2024-12-10 DIAGNOSIS — F32.A DEPRESSION, UNSPECIFIED: ICD-10-CM

## 2024-12-10 DIAGNOSIS — F41.9 ANXIETY DISORDER, UNSPECIFIED: ICD-10-CM

## 2024-12-10 PROCEDURE — 90837 PSYTX W PT 60 MINUTES: CPT

## 2024-12-10 PROCEDURE — 99214 OFFICE O/P EST MOD 30 MIN: CPT

## 2024-12-10 RX ORDER — QUETIAPINE FUMARATE 25 MG/1
25 TABLET ORAL
Qty: 30 | Refills: 1 | Status: ACTIVE | COMMUNITY
Start: 2024-12-10 | End: 1900-01-01

## 2024-12-17 ENCOUNTER — APPOINTMENT (OUTPATIENT)
Dept: PSYCHIATRY | Facility: CLINIC | Age: 29
End: 2024-12-17

## 2024-12-24 ENCOUNTER — APPOINTMENT (OUTPATIENT)
Dept: PSYCHIATRY | Facility: CLINIC | Age: 29
End: 2024-12-24
Payer: COMMERCIAL

## 2024-12-24 PROCEDURE — 90837 PSYTX W PT 60 MINUTES: CPT

## 2024-12-31 ENCOUNTER — APPOINTMENT (OUTPATIENT)
Dept: PSYCHIATRY | Facility: CLINIC | Age: 29
End: 2024-12-31

## 2025-01-07 ENCOUNTER — APPOINTMENT (OUTPATIENT)
Dept: PSYCHIATRY | Facility: CLINIC | Age: 30
End: 2025-01-07
Payer: COMMERCIAL

## 2025-01-07 DIAGNOSIS — F98.8 OTHER SPECIFIED BEHAVIORAL AND EMOTIONAL DISORDERS WITH ONSET USUALLY OCCURRING IN CHILDHOOD AND ADOLESCENCE: ICD-10-CM

## 2025-01-07 DIAGNOSIS — F41.9 ANXIETY DISORDER, UNSPECIFIED: ICD-10-CM

## 2025-01-07 DIAGNOSIS — F32.A DEPRESSION, UNSPECIFIED: ICD-10-CM

## 2025-01-07 PROCEDURE — 90837 PSYTX W PT 60 MINUTES: CPT

## 2025-01-14 ENCOUNTER — APPOINTMENT (OUTPATIENT)
Dept: PSYCHIATRY | Facility: CLINIC | Age: 30
End: 2025-01-14

## 2025-01-21 ENCOUNTER — APPOINTMENT (OUTPATIENT)
Dept: PSYCHIATRY | Facility: CLINIC | Age: 30
End: 2025-01-21
Payer: COMMERCIAL

## 2025-01-21 DIAGNOSIS — F32.A DEPRESSION, UNSPECIFIED: ICD-10-CM

## 2025-01-21 PROCEDURE — 90837 PSYTX W PT 60 MINUTES: CPT

## 2025-01-28 ENCOUNTER — APPOINTMENT (OUTPATIENT)
Dept: PSYCHIATRY | Facility: CLINIC | Age: 30
End: 2025-01-28
Payer: COMMERCIAL

## 2025-01-28 DIAGNOSIS — F32.A DEPRESSION, UNSPECIFIED: ICD-10-CM

## 2025-01-28 PROCEDURE — 90837 PSYTX W PT 60 MINUTES: CPT

## 2025-02-04 ENCOUNTER — APPOINTMENT (OUTPATIENT)
Dept: PSYCHIATRY | Facility: CLINIC | Age: 30
End: 2025-02-04
Payer: COMMERCIAL

## 2025-02-04 DIAGNOSIS — F32.A DEPRESSION, UNSPECIFIED: ICD-10-CM

## 2025-02-04 PROCEDURE — 90837 PSYTX W PT 60 MINUTES: CPT

## 2025-02-11 ENCOUNTER — APPOINTMENT (OUTPATIENT)
Dept: PSYCHIATRY | Facility: CLINIC | Age: 30
End: 2025-02-11
Payer: COMMERCIAL

## 2025-02-11 DIAGNOSIS — F32.A DEPRESSION, UNSPECIFIED: ICD-10-CM

## 2025-02-11 PROCEDURE — 90837 PSYTX W PT 60 MINUTES: CPT

## 2025-02-11 PROCEDURE — 99214 OFFICE O/P EST MOD 30 MIN: CPT

## 2025-02-18 ENCOUNTER — APPOINTMENT (OUTPATIENT)
Dept: PSYCHIATRY | Facility: CLINIC | Age: 30
End: 2025-02-18
Payer: COMMERCIAL

## 2025-02-18 DIAGNOSIS — F32.A DEPRESSION, UNSPECIFIED: ICD-10-CM

## 2025-02-18 PROCEDURE — 90834 PSYTX W PT 45 MINUTES: CPT

## 2025-02-26 ENCOUNTER — APPOINTMENT (OUTPATIENT)
Dept: PSYCHIATRY | Facility: CLINIC | Age: 30
End: 2025-02-26
Payer: COMMERCIAL

## 2025-02-26 DIAGNOSIS — F98.8 OTHER SPECIFIED BEHAVIORAL AND EMOTIONAL DISORDERS WITH ONSET USUALLY OCCURRING IN CHILDHOOD AND ADOLESCENCE: ICD-10-CM

## 2025-02-26 DIAGNOSIS — F32.A DEPRESSION, UNSPECIFIED: ICD-10-CM

## 2025-02-26 DIAGNOSIS — F41.9 ANXIETY DISORDER, UNSPECIFIED: ICD-10-CM

## 2025-02-26 PROCEDURE — 90834 PSYTX W PT 45 MINUTES: CPT

## 2025-03-03 ENCOUNTER — EMERGENCY (EMERGENCY)
Facility: HOSPITAL | Age: 30
LOS: 1 days | Discharge: HOME | End: 2025-03-03
Admitting: STUDENT IN AN ORGANIZED HEALTH CARE EDUCATION/TRAINING PROGRAM
Payer: COMMERCIAL

## 2025-03-03 VITALS
OXYGEN SATURATION: 98 % | DIASTOLIC BLOOD PRESSURE: 81 MMHG | TEMPERATURE: 99 F | SYSTOLIC BLOOD PRESSURE: 130 MMHG | RESPIRATION RATE: 18 BRPM | HEART RATE: 91 BPM

## 2025-03-03 DIAGNOSIS — F33.1 MAJOR DEPRESSIVE DISORDER, RECURRENT, MODERATE: ICD-10-CM

## 2025-03-03 PROCEDURE — 93010 ELECTROCARDIOGRAM REPORT: CPT

## 2025-03-03 PROCEDURE — 99284 EMERGENCY DEPT VISIT MOD MDM: CPT

## 2025-03-03 NOTE — ED BEHAVIORAL HEALTH ASSESSMENT NOTE - DESCRIPTION
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 Has not required PRNs or restraints.  Vital Signs Last 24 Hrs  T(C): 37 (03 Mar 2025 15:18), Max: 37 (03 Mar 2025 15:18)  T(F): 98.6 (03 Mar 2025 15:18), Max: 98.6 (03 Mar 2025 15:18)  HR: 91 (03 Mar 2025 15:18) (91 - 91)  BP: 130/81 (03 Mar 2025 15:18) (130/81 - 130/81)  BP(mean): --  RR: 18 (03 Mar 2025 15:18) (18 - 18)  SpO2: 98% (03 Mar 2025 15:18) (98% - 98%) none known

## 2025-03-03 NOTE — ED ADULT NURSE REASSESSMENT NOTE - NS ED NURSE REASSESS COMMENT FT1
Received pt and report at change of shift. Pt is awake, a&ox4, calm in no acute distress. Psych eval completed and medically cleared for discharge to home with family. DC instructions provided with verbalized understanding.

## 2025-03-03 NOTE — ED PROVIDER NOTE - NSFOLLOWUPINSTRUCTIONS_ED_ALL_ED_FT
Follow up with your primary care physician and psychiatrist in 48-72 hours.  You may also see the psychiatrist at [facility]:    [address] [place] [address]  Phone: [phone]    [facility] Information:    -Walk-in hours: Monday to Friday, 9am to 3pm   -Almost all walk-in patients will be able to see a psych prescriber the same day   -Scheduled, non-urgent, evening remote/virtual appointments are available on a limited basis. Call our  to inquire about these: [phone]. A crisis center clinician screens these requests in the late afternoon and if appropriate it takes a few days to set-up.   -Visits take about 2 to 4 hours total   -Mornings are the best time for patients to arrive    For Telehealth options try:  [employer]: [email or web address] (to access psychiatrist or therapist)  [employer]: [employer] (to access psychiatrist or therapist)  Better [facility]: [email or web address] (Largest online therapy group)      SEEK IMMEDIATE MEDICAL CARE IF YOU HAVE ANY OF THE FOLLOWING SYMPTOMS: thoughts about hurting or killing yourself, thoughts about hurting or killing somebody else, hallucinations or any other worsening or persistent symptoms OR ANY NEW OR CONCERNING SYMPTOMS.

## 2025-03-03 NOTE — ED BEHAVIORAL HEALTH ASSESSMENT NOTE - NSBHATTESTCOMMENTATTENDFT_PSY_A_CORE
Patient also seen by Attending, +++ concrete, overtly objective / fhdq-qrczffoa-ktzzzpwoozrsfd of thought content, polite in a distinct manner that is more learned / mimicked as opposed to naturally facilitated. Suspecting Spectrum disorder. Currently no clinical indication for increased level fo care; Patient not interested in inpatient psych admission at this time anyway. No grounds for involuntary admission, can be discharged.

## 2025-03-03 NOTE — ED BEHAVIORAL HEALTH ASSESSMENT NOTE - HPI (INCLUDE ILLNESS QUALITY, SEVERITY, DURATION, TIMING, CONTEXT, MODIFYING FACTORS, ASSOCIATED SIGNS AND SYMPTOMS)
Patient is a 30y/o man currently domiciled at home with parents, enrolled as a student at [employer] and pursuing a degree with Biochemistry, with past psych history of depression and anxiety, no past hospitalizations, no past suicide attempts, currently managed outpt with psychiatrist and therapist and on Wellbutrin, Seroquel, and Lexapro who is presenting to the ED after Patient is a 30y/o man currently domiciled at home with parents, enrolled as a student at [employer] and pursuing a degree with Biochemistry, with past psych history of depression and anxiety, no past hospitalizations, no past suicide attempts, currently managed outpt with psychiatrist and therapist and on Wellbutrin, Seroquel, and Lexapro who is presenting to the ED for concerns of suicidal ideation after recent stressor of missing deadline for application.    On assessment, patient is calm and cooperative; per ED team patient has since calmed down and retracting suicidal ideation statements. Patient reports he came to the ED because he was feeling overwhelmed after he missed his deadline for an application. States he was so upset because his mother kept reminding him to complete it but he lost track of time, and then felt he had failed his parents. States he has low self esteem and self worth and often feels he is disappointing his parents. States his parents often reassure him but he still feels bad. States he has been suffering with depression for sometime, and states medications help but have not completely resolved depression. States depression presents as low self-esteem and self worth, anhedonia, low energy, and concentration. States he does have outpt provider. States he has been having passive suicidal ideation for the past couple months, but was managing but today significantly worsened after finding out he missed the deadline. States in the moment he was so upset with himself was having active suicidal ideation with thoughts of driving off the road. Denies any preparatory actions, and states he was concerned about his own thoughts and therefore went to the crisis center to ask for help. Patient denies any past history of suicide attempts or past history of NSSIB.     Patient reports since coming to the ED he denies any current suicidal ideation, denies any plan or intent. Denies any homicidal ideation, auditory/visual hallucinations or paranoia. Patient denies any manic symptoms.  Patient denies any substance use, and denies access to firearms.  Patient is already connected with care and is being followed by an outpt psychiatrist and therapist, with his next appointment this Wednesday.    Additional collateral from parents- see chart note.

## 2025-03-03 NOTE — ED BEHAVIORAL HEALTH ASSESSMENT NOTE - SUMMARY
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    On evaluation, patient is calm and cooperative. At this time patient has calmed down and retracting suicidal ideation statements. Stating he felt overwhelmed earlier today after missing his application deadline, but since comign to the ED has calmed down and has gained more perspective. States he has so much to live for including his parents and loved ones. Denies any current suicidal ideation, denies any intent or plan. Additionally reports he has an outpt psychiatrist and therapist, with his next appointment with his therapist this wednesday. Collateral confirmed above statements. parents deny any acute safety concerns and feel comfortable with patient returning home and following up with psychiatrist and therapist.  Patient completed safety plan and able to contract for safety if suicidal ideation worsen. Discussed safety plan with parents and parents in agreement with holding onto car keys for now. Given the above patient does not meet criteria for involuntary admission and declining voluntary admission. patient to be discharged home and follow up with outpt providers.    1. DC home today on current regimen; safety plan completed, patient to follow up with outpt psychiatrist and therapist  2. Psychoeducation provided regarding importance of compliance with outpatient appointments and medications.  3. Pt was advised to remain abstinent with substances.  4. Pt was advised to dial 911 or return to ER if they become danger to self or others

## 2025-03-03 NOTE — ED BEHAVIORAL HEALTH ASSESSMENT NOTE - CURRENT PLAN
Follow up with your doctor in a few days.  Return to the urgent care or go to the ER if symptoms get worse.    BOOT AS NEEDED,   KEEP COMPRESSION BANDAGE AROUND ANKLE TO PROVIDE EXTRA SUPPORTS.  FOLLOW UP WITH ORTHOPEDICS FOR FURTHER MANAGEMENT.  NO SPORTS UNTIL ABLE TO WALK WITHOUT PAIN.      Understanding Ankle Sprain    The ankle is the joint where the leg and foot meet. Bones are held in place by connective tissue called ligaments. When ankle ligaments are stretched to the point of pain and injury, it is called an ankle sprain. A sprain can tear the ligaments. These tears can be very small but still cause pain. Ankle sprains can be mild or severe.  What causes an ankle sprain?  A sprain may occur when you twist your ankle or bend it too far. This can happen when you stumble or fall. Things that can make an ankle sprain more likely include:  · Having had an ankle sprain before  · Playing sports that involve running and jumping. Or playing contact sports such as football or hockey.  · Wearing shoes that dont support your feet and ankles well  · Having ankles with poor strength and flexibility  Symptoms of an ankle sprain  Symptoms may include:  · Pain or soreness in the ankle  · Swelling  · Redness or bruising  · Not being able to walk or put weight on the affected foot  · Reduced range of motion in the ankle  · A popping or tearing feeling at the time the sprain occurs  · An abnormal or dislocated look to the ankle  · Instability or too much range of motion in the ankle  Treatment for an ankle sprain  Treatment focuses on reducing pain and swelling, and avoiding further injury. Treatments may include:  · Resting the ankle. Avoid putting weight on it. This may mean using crutches until the sprain heals.  · Prescription or over-the-counter pain medicines. These help reduce swelling and pain.  · Cold packs. These help reduce pain and swelling.  · Raising your ankle above your heart. This helps reduce  swelling.  · Wrapping the ankle with an elastic bandage or ankle brace. This helps reduce swelling and gives some support to the ankle. In rare cases, you may need a cast or boot.  · Stretching and other exercises. These improve flexibility and strength.  · Heat packs. These may be recommended before doing ankle exercises.  Possible complications of an ankle sprain  An ankle that has been weakened by a sprain can be more likely to have repeated sprains afterward. Doing exercises to strengthen your ankle and improve balance can reduce your risk for repeated sprains. Other possible complications are long-term (chronic) pain or an ankle that remains unstable.  When to call your healthcare provider  Call your healthcare provider right away if you have any of these:  · Fever of 100.4°F (38°C) or higher, or as directed  · Pain, numbness, discoloration, or coldness in the foot or toes  · Pain that gets worse  · Symptoms that dont get better, or get worse  · New symptoms   Date Last Reviewed: 3/10/2016  © 3005-4218 [employer] "[employer]. [address] [place] [address], [place] [address]. All rights reserved. This information is not intended as a substitute for professional medical care. Always follow your healthcare professional's instructions.         Yes

## 2025-03-03 NOTE — ED BEHAVIORAL HEALTH NOTE - BEHAVIORAL HEALTH NOTE
At the provider's request, the patient's parents, Sulema ([phone]), were contacted for collateral information. The following information was obtained from the parents:    Patient Presentation: The patient is a 29-year-old male, residing with his parents, and currently a student at [employer]. He has a history of depression and anxiety, is single with no children, and was brought in by EMS.    Reason for ED Visit: The patient's family encouraged him to pursue an extracurricular activity. He mistakenly believed the deadline was in March and subsequently discovered he had missed it. This led to an emotional outburst where he called his mother, crying and screaming, "I'm done, I've tried, I've tried, I'm done." After experiencing this distress in class, the patient endorsed suicidal ideation (SI) to a counselor.    Symptom History: According to his parents, the patient has been doing well recently, except when faced with school-related tasks. He reportedly overreacts when he misses deadlines. He endorsed SI to the counselor but denies any plan or intent. There are no past suicide attempts reported but a history of suicidal ideation. His eating and showering habits are at baseline. He is struggling with sleep and has been prescribed medication for it, though the specific medication is unknown. No auditory or visual hallucinations, paranoia, or delusions were noted. He can become engrossed in video games, neglecting schoolwork, and subsequently lashing out with feelings of worthlessness. His family notes that these extreme statements typically occur after such incidents.    Baseline: His baseline functioning is described as "fine" without "bumps in the road" until something goes wrong. He is described as smart and a good scholar, but struggles with coping skills.    Stressors: Unknown.    Medical Problems: None reported.    Medications: Quetiapine (dosage unknown), Bupropion 150mg, Escitalopram 20mg, and a sleep medication (unspecified). He reports medication     Treatment Team: The patient received 30 sessions of ECT two years ago with a positive response. He is currently linked to Dr. Arrieta ([facility]). No past psychiatric admissions reported.    Drug/Alcohol Use: None reported.    Violence/Aggression: The patient is not considered violent or aggressive, and has no access to firearms.    Family History: Unknown.    Disposition: The family is not recommending psychiatric admission at this time. At the provider's request, the patient's parents, Sulema ([phone]), were contacted for collateral information. The following information was obtained from the parents:    Patient Presentation: The patient is a 29-year-old male, residing with his parents, and currently a student at [employer]. He has a history of depression and anxiety, is single with no children, and was brought in by EMS.    Reason for ED Visit: The patient's family encouraged him to pursue an extracurricular activity. He mistakenly believed the deadline was in March and subsequently discovered he had missed it. This led to an emotional outburst where he called his mother, crying and screaming, "I'm done, I've tried, I've tried, I'm done." After experiencing this distress in class, the patient endorsed suicidal ideation (SI) to a counselor.    Symptom History: According to his parents, the patient has been doing well recently, except when faced with school-related tasks. He reportedly overreacts when he misses deadlines. He endorsed SI to the counselor but denies any plan or intent. There are no past suicide attempts reported but a history of suicidal ideation. His eating and showering habits are at baseline. He is struggling with sleep and has been prescribed medication for it, though the specific medication is unknown. No auditory or visual hallucinations, paranoia, or delusions were noted. He can become engrossed in video games, neglecting schoolwork, and subsequently lashing out with feelings of worthlessness. His family notes that these extreme statements typically occur after such incidents.    Baseline: His baseline functioning is described as "fine" without "bumps in the road" until something goes wrong. He is described as smart and a good scholar, but struggles with coping skills.    Stressors: Unknown.    Medical Problems: None reported.    Medications: Quetiapine (dosage unknown), Bupropion 150mg, Escitalopram 20mg, and a sleep medication (unspecified). He reports medication     Treatment Team: The patient received 30 sessions of ECT two years ago with a positive response. He is currently linked to Dr. Arrieta ([facility]). No past psychiatric admissions reported.    Drug/Alcohol Use: None reported.    Violence/Aggression: The patient is not considered violent or aggressive, and has no access to firearms.    Family History: Unknown.    Disposition: The family is not recommending psychiatric admission at this time.    Patient cleared for discharge and encouraged to follow up with outpatient provider. Mother to pick patient up from ED.

## 2025-03-03 NOTE — ED PROVIDER NOTE - OBJECTIVE STATEMENT
This is a 29 yr old M, pm depression, anxiety This is a 29 yr old M, pmh depression, anxiety with c/o si with a plan to drive off the highway into a wall. Pt reports felt  very anxious and was seen by his school guidance consuler exressed radha ideations. Reports clarence in tx, compliant with meds, lives with his parent and attends [employer]. He reports his anxiety already resolved and would not hurt himself. Denies prior psych hospitalization, si, hi, ah, vh, denies hx of self injuries.

## 2025-03-03 NOTE — ED BEHAVIORAL HEALTH ASSESSMENT NOTE - OTHER PAST PSYCHIATRIC HISTORY (INCLUDE DETAILS REGARDING ONSET, COURSE OF ILLNESS, INPATIENT/OUTPATIENT TREATMENT)
Currently managed outpt with psychiatrist and therapist  no past hospitalizations, no past suicide attempts  currently on Wellbutrin, Seroquel, and Lexapro  Previously on ECT in 2021, none recently

## 2025-03-03 NOTE — ED BEHAVIORAL HEALTH ASSESSMENT NOTE - ADULT OR CHILD PROTECTIVE SERVICES INVOLVEMENT
Patient here for TB skin test for school. Will return in 48 hours to have TB skin test read. Per    No

## 2025-03-03 NOTE — ED PROVIDER NOTE - CLINICAL SUMMARY MEDICAL DECISION MAKING FREE TEXT BOX
This is a 29 yr old M, pmh depression, anxiety with c/o si with a plan to drive off the highway into a wall. Pt reports felt  very anxious and was seen by his school guidance consuler expressed  radha ideations. Reports currently in tx, compliant with meds, lives with his parent and attends [employer]. He reports his anxiety already resolved and would not hurt himself. Denies prior psych hospitalization, si, hi, ah, vh, denies hx of self injuries.  Psychiatric consult requested- recommendation out patient follow up

## 2025-03-03 NOTE — ED BEHAVIORAL HEALTH NOTE - BEHAVIORAL HEALTH NOTE
Call received from Deonna Oconnor JUSTIN [facility] psychologist. [phone].      Psychologist reports that patient came today for crisis session.  Has presented on/off.  States that he has an external treatment team.  In treatment with psychiatrist  Janie Rodriguez and therapist Stephane Oconnor. Does not have the contact information.    Reports that he presented shaking and crying and banging his head against the wall and that he was not able to keep self safe.  Unable to elaborate on stressors and states that he has no will to live.  Reports that he has "already failed because he is 28yo and still in school."  Described that he is currently a keshawn but has been in school for a long time, on and off since 2015.  Seen him a few times in the past month.  Reports that he is never going to amount to anything or do anything with his degree in Biochemistry.      States that she is sending him for reporting wanting to die.  States that he did have a plan to take off his seatbelt, drive off the highway and drive into a wall and states that he knew exactly where he was going to do it and has the means to do so and had his car with him. Unknown past psych history, no known SA.  States that she believed that he had past psychiatric hospitalizations but reportedly told EMS that he did not have any past psychiatric admission.  Has been in therapy, on medications and received ECT.     Meds: Lexapro, Wellbutrin, Seroquel    Per psychologist, reports that patient called his parents to inform them that he was going to the hospital and states that his parents tried to convince him not go to the hospital and may not be the best support system for patient.

## 2025-03-03 NOTE — ED BEHAVIORAL HEALTH ASSESSMENT NOTE - REFERRAL / APPOINTMENT DETAILS
Unknown
f/u therapist (next appointment on wed) and to call psychiatrist to request earlier appointment

## 2025-03-03 NOTE — ED BEHAVIORAL HEALTH ASSESSMENT NOTE - RISK ASSESSMENT
Patient is at low/mod acute risk for suicide. Though patient recently reported active suicidal ideation with plan patient has since calmed down and currently denying any suicidal ideation, no intent or plan.  Risk factors:  Protective factors:  Given the above patient is at low/mod acute risk for suicide, but elevated chronic risk Patient is at low/mod acute risk for suicide. Though patient recently reported active suicidal ideation with plan in the context of a recent situational stressor, since being in the ED patient has since calmed down and currently denying any suicidal ideation, no intent or plan.  Risk factors:  Protective factors:  Given the above patient is at low acute risk for suicide, but elevated chronic risk. Patient does not meet criteria for involuntary admission and declining voluntary admission, patient is able to adequately safety plan. Patient to be discharged home with close follow up with outpt psychiatrist and therapist.

## 2025-03-03 NOTE — ED ADULT TRIAGE NOTE - CHIEF COMPLAINT QUOTE
endorsing "wanting to drive off a highway into a wall" to his counselor, sent in by counselor for psych eval. Denies HI/AVH. Denies drug and alcohol use. Calm and cooperative. Hx of depression compliant with med.

## 2025-03-03 NOTE — ED PROVIDER NOTE - PATIENT PORTAL LINK FT
You can access the FollowMyHealth Patient Portal offered by [facility] by registering at the following website: [email or web address]. By joining [facility]’s FollowMyHealth portal, you will also be able to view your health information using other applications (apps) compatible with our system.

## 2025-03-03 NOTE — ED ADULT NURSE NOTE - OBJECTIVE STATEMENT
endorsing "wanting to drive off a highway into a wall" to his counselor, sent in by counselor for psych eval. Denies HI/AVH. Denies drug and alcohol use. Calm and cooperative. Hx of depression compliant with med.  Patient brought to  area for above complaints. Evaluated by medical provider. Patient states that he was suicidal but not at this time. Patient will be evaluated by psychiatry and is calm and cooperative.    DIANE Bay

## 2025-03-03 NOTE — ED PROVIDER NOTE - CCCP TRG CHIEF CMPLNT
What Is The Reason For Today's Visit?: Full Body Skin Examination
What Is The Reason For Today's Visit? (Being Monitored For X): concerning skin lesions on an annual basis
Additional History: Patient also wants to discuss treatment for toenail fungus on left foot
psychiatric evaluation

## 2025-03-04 NOTE — ED BEHAVIORAL HEALTH NOTE - BEHAVIORAL HEALTH NOTE
high risk log:   writer called patient [phone] who states that he is doing well and he is at school. He states that he has a follow up with his therapist tomorrow.

## 2025-03-05 ENCOUNTER — APPOINTMENT (OUTPATIENT)
Dept: PSYCHIATRY | Facility: CLINIC | Age: 30
End: 2025-03-05
Payer: COMMERCIAL

## 2025-03-05 PROCEDURE — 90834 PSYTX W PT 45 MINUTES: CPT

## 2025-03-12 ENCOUNTER — APPOINTMENT (OUTPATIENT)
Dept: PSYCHIATRY | Facility: CLINIC | Age: 30
End: 2025-03-12
Payer: COMMERCIAL

## 2025-03-12 DIAGNOSIS — F32.A DEPRESSION, UNSPECIFIED: ICD-10-CM

## 2025-03-12 PROCEDURE — 90834 PSYTX W PT 45 MINUTES: CPT

## 2025-03-18 ENCOUNTER — APPOINTMENT (OUTPATIENT)
Dept: PSYCHIATRY | Facility: CLINIC | Age: 30
End: 2025-03-18
Payer: COMMERCIAL

## 2025-03-18 PROCEDURE — 99214 OFFICE O/P EST MOD 30 MIN: CPT

## 2025-03-19 ENCOUNTER — APPOINTMENT (OUTPATIENT)
Dept: PSYCHIATRY | Facility: CLINIC | Age: 30
End: 2025-03-19
Payer: COMMERCIAL

## 2025-03-19 DIAGNOSIS — F32.A DEPRESSION, UNSPECIFIED: ICD-10-CM

## 2025-03-19 DIAGNOSIS — F98.8 OTHER SPECIFIED BEHAVIORAL AND EMOTIONAL DISORDERS WITH ONSET USUALLY OCCURRING IN CHILDHOOD AND ADOLESCENCE: ICD-10-CM

## 2025-03-19 DIAGNOSIS — F41.9 ANXIETY DISORDER, UNSPECIFIED: ICD-10-CM

## 2025-03-19 PROCEDURE — 90834 PSYTX W PT 45 MINUTES: CPT

## 2025-03-26 ENCOUNTER — APPOINTMENT (OUTPATIENT)
Dept: PSYCHIATRY | Facility: CLINIC | Age: 30
End: 2025-03-26
Payer: COMMERCIAL

## 2025-03-26 PROCEDURE — 90834 PSYTX W PT 45 MINUTES: CPT

## 2025-04-02 ENCOUNTER — APPOINTMENT (OUTPATIENT)
Dept: PSYCHIATRY | Facility: CLINIC | Age: 30
End: 2025-04-02
Payer: COMMERCIAL

## 2025-04-02 PROCEDURE — 90834 PSYTX W PT 45 MINUTES: CPT

## 2025-04-08 ENCOUNTER — APPOINTMENT (OUTPATIENT)
Dept: PSYCHIATRY | Facility: CLINIC | Age: 30
End: 2025-04-08

## 2025-04-09 ENCOUNTER — APPOINTMENT (OUTPATIENT)
Dept: PSYCHIATRY | Facility: CLINIC | Age: 30
End: 2025-04-09

## 2025-04-15 NOTE — ED BEHAVIORAL HEALTH ASSESSMENT NOTE - NSBHATTESTTYPEVISIT_PSY_A_CORE
----- Message from Peter Baptiste MD sent at 4/15/2025 11:44 AM CDT -----  A1c stable at 5.7, consistent with prediabetes.  Lipid panel overall unremarkable.  CBC and CMP are normal.  Microalbumin/creatinine ratio is also within normal range.  X-ray appears to show cardiomegaly.  Also shows large left pleural effusion, which would explain his left sided rib discomfort.  Please place order for thoracentesis with pleural fluid analysis with IR for further evaluation. Pleural fluid analysis should include cell counts and cell differential, cytology, total protein, lactate dehydrogenase, glucose, pleural fluid pH, culture and gram stain.   Also place order for blood draw for serum LDH level.   May also place order for echo, to be completed after thoracentesis.       
Patient was notified of results and recommendations, Grupo verbalized understanding and has no further questions.  Orders have been placed for ECHO, Service to IR and LDH level.  
Attending with Resident/Fellow/Student

## 2025-04-16 ENCOUNTER — APPOINTMENT (OUTPATIENT)
Dept: PSYCHIATRY | Facility: CLINIC | Age: 30
End: 2025-04-16
Payer: COMMERCIAL

## 2025-04-16 DIAGNOSIS — F32.A DEPRESSION, UNSPECIFIED: ICD-10-CM

## 2025-04-16 DIAGNOSIS — F41.9 ANXIETY DISORDER, UNSPECIFIED: ICD-10-CM

## 2025-04-16 DIAGNOSIS — F98.8 OTHER SPECIFIED BEHAVIORAL AND EMOTIONAL DISORDERS WITH ONSET USUALLY OCCURRING IN CHILDHOOD AND ADOLESCENCE: ICD-10-CM

## 2025-04-16 PROCEDURE — 90834 PSYTX W PT 45 MINUTES: CPT

## 2025-04-30 ENCOUNTER — APPOINTMENT (OUTPATIENT)
Dept: PSYCHIATRY | Facility: CLINIC | Age: 30
End: 2025-04-30
Payer: COMMERCIAL

## 2025-04-30 PROCEDURE — 90834 PSYTX W PT 45 MINUTES: CPT

## 2025-05-07 ENCOUNTER — APPOINTMENT (OUTPATIENT)
Dept: PSYCHIATRY | Facility: CLINIC | Age: 30
End: 2025-05-07
Payer: COMMERCIAL

## 2025-05-07 DIAGNOSIS — F41.9 ANXIETY DISORDER, UNSPECIFIED: ICD-10-CM

## 2025-05-07 DIAGNOSIS — F98.8 OTHER SPECIFIED BEHAVIORAL AND EMOTIONAL DISORDERS WITH ONSET USUALLY OCCURRING IN CHILDHOOD AND ADOLESCENCE: ICD-10-CM

## 2025-05-07 DIAGNOSIS — F32.A DEPRESSION, UNSPECIFIED: ICD-10-CM

## 2025-05-07 PROCEDURE — 90834 PSYTX W PT 45 MINUTES: CPT

## 2025-05-14 ENCOUNTER — APPOINTMENT (OUTPATIENT)
Dept: PSYCHIATRY | Facility: CLINIC | Age: 30
End: 2025-05-14
Payer: COMMERCIAL

## 2025-05-14 PROCEDURE — 90834 PSYTX W PT 45 MINUTES: CPT

## 2025-05-14 PROCEDURE — 99214 OFFICE O/P EST MOD 30 MIN: CPT

## 2025-05-21 ENCOUNTER — APPOINTMENT (OUTPATIENT)
Dept: PSYCHIATRY | Facility: CLINIC | Age: 30
End: 2025-05-21
Payer: COMMERCIAL

## 2025-05-21 DIAGNOSIS — F98.8 OTHER SPECIFIED BEHAVIORAL AND EMOTIONAL DISORDERS WITH ONSET USUALLY OCCURRING IN CHILDHOOD AND ADOLESCENCE: ICD-10-CM

## 2025-05-21 DIAGNOSIS — F41.9 ANXIETY DISORDER, UNSPECIFIED: ICD-10-CM

## 2025-05-21 DIAGNOSIS — F32.A DEPRESSION, UNSPECIFIED: ICD-10-CM

## 2025-05-21 PROCEDURE — 90834 PSYTX W PT 45 MINUTES: CPT

## 2025-05-28 ENCOUNTER — APPOINTMENT (OUTPATIENT)
Dept: PSYCHIATRY | Facility: CLINIC | Age: 30
End: 2025-05-28

## 2025-06-11 ENCOUNTER — APPOINTMENT (OUTPATIENT)
Dept: PSYCHIATRY | Facility: CLINIC | Age: 30
End: 2025-06-11

## 2025-06-11 PROCEDURE — 99214 OFFICE O/P EST MOD 30 MIN: CPT

## 2025-06-11 PROCEDURE — 90837 PSYTX W PT 60 MINUTES: CPT

## 2025-06-18 ENCOUNTER — APPOINTMENT (OUTPATIENT)
Dept: PSYCHIATRY | Facility: CLINIC | Age: 30
End: 2025-06-18
Payer: COMMERCIAL

## 2025-06-18 PROCEDURE — 90837 PSYTX W PT 60 MINUTES: CPT

## 2025-07-07 ENCOUNTER — APPOINTMENT (OUTPATIENT)
Dept: PSYCHIATRY | Facility: CLINIC | Age: 30
End: 2025-07-07

## 2025-07-14 ENCOUNTER — APPOINTMENT (OUTPATIENT)
Dept: PSYCHIATRY | Facility: CLINIC | Age: 30
End: 2025-07-14
Payer: COMMERCIAL

## 2025-07-14 PROCEDURE — 90834 PSYTX W PT 45 MINUTES: CPT

## 2025-07-21 ENCOUNTER — APPOINTMENT (OUTPATIENT)
Dept: PSYCHIATRY | Facility: CLINIC | Age: 30
End: 2025-07-21
Payer: COMMERCIAL

## 2025-07-21 PROCEDURE — 90834 PSYTX W PT 45 MINUTES: CPT

## 2025-07-28 ENCOUNTER — APPOINTMENT (OUTPATIENT)
Dept: PSYCHIATRY | Facility: CLINIC | Age: 30
End: 2025-07-28
Payer: COMMERCIAL

## 2025-07-28 DIAGNOSIS — F32.A DEPRESSION, UNSPECIFIED: ICD-10-CM

## 2025-07-28 PROCEDURE — 90834 PSYTX W PT 45 MINUTES: CPT

## 2025-08-04 ENCOUNTER — NON-APPOINTMENT (OUTPATIENT)
Age: 30
End: 2025-08-04

## 2025-08-04 ENCOUNTER — APPOINTMENT (OUTPATIENT)
Dept: PSYCHIATRY | Facility: CLINIC | Age: 30
End: 2025-08-04

## 2025-08-11 ENCOUNTER — APPOINTMENT (OUTPATIENT)
Dept: PSYCHIATRY | Facility: CLINIC | Age: 30
End: 2025-08-11
Payer: COMMERCIAL

## 2025-08-11 DIAGNOSIS — F98.8 OTHER SPECIFIED BEHAVIORAL AND EMOTIONAL DISORDERS WITH ONSET USUALLY OCCURRING IN CHILDHOOD AND ADOLESCENCE: ICD-10-CM

## 2025-08-11 DIAGNOSIS — F32.A DEPRESSION, UNSPECIFIED: ICD-10-CM

## 2025-08-11 DIAGNOSIS — F41.9 ANXIETY DISORDER, UNSPECIFIED: ICD-10-CM

## 2025-08-11 PROCEDURE — 90834 PSYTX W PT 45 MINUTES: CPT

## 2025-08-13 ENCOUNTER — APPOINTMENT (OUTPATIENT)
Dept: PSYCHIATRY | Facility: CLINIC | Age: 30
End: 2025-08-13
Payer: COMMERCIAL

## 2025-08-13 PROCEDURE — 99214 OFFICE O/P EST MOD 30 MIN: CPT

## 2025-08-21 ENCOUNTER — NON-APPOINTMENT (OUTPATIENT)
Age: 30
End: 2025-08-21

## 2025-09-02 ENCOUNTER — APPOINTMENT (OUTPATIENT)
Dept: PSYCHIATRY | Facility: CLINIC | Age: 30
End: 2025-09-02

## 2025-09-05 ENCOUNTER — APPOINTMENT (OUTPATIENT)
Dept: PSYCHIATRY | Facility: CLINIC | Age: 30
End: 2025-09-05
Payer: COMMERCIAL

## 2025-09-05 DIAGNOSIS — F32.A DEPRESSION, UNSPECIFIED: ICD-10-CM

## 2025-09-05 DIAGNOSIS — F41.9 ANXIETY DISORDER, UNSPECIFIED: ICD-10-CM

## 2025-09-05 DIAGNOSIS — F98.8 OTHER SPECIFIED BEHAVIORAL AND EMOTIONAL DISORDERS WITH ONSET USUALLY OCCURRING IN CHILDHOOD AND ADOLESCENCE: ICD-10-CM

## 2025-09-05 PROCEDURE — 90837 PSYTX W PT 60 MINUTES: CPT | Mod: 95

## 2025-09-09 ENCOUNTER — APPOINTMENT (OUTPATIENT)
Dept: PSYCHIATRY | Facility: CLINIC | Age: 30
End: 2025-09-09

## 2025-09-19 ENCOUNTER — APPOINTMENT (OUTPATIENT)
Dept: PSYCHIATRY | Facility: CLINIC | Age: 30
End: 2025-09-19
Payer: COMMERCIAL

## 2025-09-19 DIAGNOSIS — F41.9 ANXIETY DISORDER, UNSPECIFIED: ICD-10-CM

## 2025-09-19 DIAGNOSIS — F32.A DEPRESSION, UNSPECIFIED: ICD-10-CM

## 2025-09-19 DIAGNOSIS — F98.8 OTHER SPECIFIED BEHAVIORAL AND EMOTIONAL DISORDERS WITH ONSET USUALLY OCCURRING IN CHILDHOOD AND ADOLESCENCE: ICD-10-CM

## 2025-09-19 PROCEDURE — 90837 PSYTX W PT 60 MINUTES: CPT | Mod: 95
